# Patient Record
Sex: MALE | Race: OTHER | HISPANIC OR LATINO
[De-identification: names, ages, dates, MRNs, and addresses within clinical notes are randomized per-mention and may not be internally consistent; named-entity substitution may affect disease eponyms.]

---

## 2017-03-06 ENCOUNTER — MEDICATION RENEWAL (OUTPATIENT)
Age: 62
End: 2017-03-06

## 2017-04-11 ENCOUNTER — APPOINTMENT (OUTPATIENT)
Dept: INTERNAL MEDICINE | Facility: CLINIC | Age: 62
End: 2017-04-11

## 2017-04-11 ENCOUNTER — LABORATORY RESULT (OUTPATIENT)
Age: 62
End: 2017-04-11

## 2017-04-11 ENCOUNTER — MEDICATION RENEWAL (OUTPATIENT)
Age: 62
End: 2017-04-11

## 2017-04-11 VITALS — HEIGHT: 65 IN | WEIGHT: 207 LBS | BODY MASS INDEX: 34.49 KG/M2

## 2017-04-11 VITALS — HEART RATE: 72 BPM | SYSTOLIC BLOOD PRESSURE: 118 MMHG | RESPIRATION RATE: 12 BRPM | DIASTOLIC BLOOD PRESSURE: 76 MMHG

## 2017-04-12 LAB
25(OH)D3 SERPL-MCNC: 23.3 NG/ML
ALBUMIN SERPL ELPH-MCNC: 4.4 G/DL
ALP BLD-CCNC: 73 U/L
ALT SERPL-CCNC: 41 U/L
ANION GAP SERPL CALC-SCNC: 17 MMOL/L
APPEARANCE: CLEAR
AST SERPL-CCNC: 33 U/L
BASOPHILS # BLD AUTO: 0.08 K/UL
BASOPHILS NFR BLD AUTO: 1.2 %
BILIRUB SERPL-MCNC: 0.4 MG/DL
BILIRUBIN URINE: NEGATIVE
BLOOD URINE: NEGATIVE
BUN SERPL-MCNC: 12 MG/DL
CALCIUM SERPL-MCNC: 9.4 MG/DL
CHLORIDE SERPL-SCNC: 102 MMOL/L
CHOLEST SERPL-MCNC: 140 MG/DL
CHOLEST/HDLC SERPL: 4.4 RATIO
CO2 SERPL-SCNC: 21 MMOL/L
COLOR: YELLOW
CREAT SERPL-MCNC: 0.73 MG/DL
CREAT SPEC-SCNC: 113 MG/DL
EOSINOPHIL # BLD AUTO: 1.16 K/UL
EOSINOPHIL NFR BLD AUTO: 17.7 %
GLUCOSE QUALITATIVE U: NORMAL MG/DL
GLUCOSE SERPL-MCNC: 159 MG/DL
HBA1C MFR BLD HPLC: 8.9 %
HCT VFR BLD CALC: 41.6 %
HDLC SERPL-MCNC: 32 MG/DL
HGB BLD-MCNC: 12.9 G/DL
IMM GRANULOCYTES NFR BLD AUTO: 0.2 %
KETONES URINE: NEGATIVE
LDLC SERPL CALC-MCNC: 80 MG/DL
LEUKOCYTE ESTERASE URINE: NEGATIVE
LYMPHOCYTES # BLD AUTO: 1.62 K/UL
LYMPHOCYTES NFR BLD AUTO: 24.7 %
MAN DIFF?: NORMAL
MCHC RBC-ENTMCNC: 23.2 PG
MCHC RBC-ENTMCNC: 31 GM/DL
MCV RBC AUTO: 74.7 FL
MICROALBUMIN 24H UR DL<=1MG/L-MCNC: 2.6 MG/DL
MICROALBUMIN/CREAT 24H UR-RTO: 23 UG/MG
MONOCYTES # BLD AUTO: 0.33 K/UL
MONOCYTES NFR BLD AUTO: 5 %
NEUTROPHILS # BLD AUTO: 3.35 K/UL
NEUTROPHILS NFR BLD AUTO: 51.2 %
NITRITE URINE: NEGATIVE
PH URINE: 6
PLATELET # BLD AUTO: 314 K/UL
POTASSIUM SERPL-SCNC: 4.3 MMOL/L
PROT SERPL-MCNC: 7.1 G/DL
PROTEIN URINE: NEGATIVE MG/DL
PSA SERPL-MCNC: 0.2 NG/ML
RBC # BLD: 5.57 M/UL
RBC # FLD: 17.8 %
SODIUM SERPL-SCNC: 140 MMOL/L
SPECIFIC GRAVITY URINE: 1.02
T4 FREE SERPL-MCNC: 1.3 NG/DL
TRIGL SERPL-MCNC: 141 MG/DL
TSH SERPL-ACNC: 0.96 UIU/ML
UROBILINOGEN URINE: 1 MG/DL
WBC # FLD AUTO: 6.55 K/UL

## 2017-05-30 ENCOUNTER — MEDICATION RENEWAL (OUTPATIENT)
Age: 62
End: 2017-05-30

## 2017-06-15 ENCOUNTER — APPOINTMENT (OUTPATIENT)
Dept: INTERNAL MEDICINE | Facility: CLINIC | Age: 62
End: 2017-06-15

## 2017-06-15 VITALS — WEIGHT: 205 LBS | BODY MASS INDEX: 34.16 KG/M2 | HEIGHT: 65 IN

## 2017-06-15 VITALS — DIASTOLIC BLOOD PRESSURE: 68 MMHG | SYSTOLIC BLOOD PRESSURE: 124 MMHG | HEART RATE: 68 BPM | RESPIRATION RATE: 14 BRPM

## 2017-06-15 LAB
GLUCOSE BLDC GLUCOMTR-MCNC: 102
HBA1C MFR BLD HPLC: 8

## 2017-08-24 ENCOUNTER — MEDICATION RENEWAL (OUTPATIENT)
Age: 62
End: 2017-08-24

## 2017-09-21 ENCOUNTER — MEDICATION RENEWAL (OUTPATIENT)
Age: 62
End: 2017-09-21

## 2017-10-16 ENCOUNTER — APPOINTMENT (OUTPATIENT)
Dept: INTERNAL MEDICINE | Facility: CLINIC | Age: 62
End: 2017-10-16
Payer: MEDICARE

## 2017-10-16 VITALS
RESPIRATION RATE: 12 BRPM | HEART RATE: 72 BPM | DIASTOLIC BLOOD PRESSURE: 70 MMHG | BODY MASS INDEX: 35.82 KG/M2 | WEIGHT: 215 LBS | HEIGHT: 65 IN | SYSTOLIC BLOOD PRESSURE: 130 MMHG

## 2017-10-16 PROCEDURE — G0008: CPT

## 2017-10-16 PROCEDURE — 99214 OFFICE O/P EST MOD 30 MIN: CPT | Mod: 25

## 2017-10-16 PROCEDURE — 36415 COLL VENOUS BLD VENIPUNCTURE: CPT

## 2017-10-16 PROCEDURE — 90688 IIV4 VACCINE SPLT 0.5 ML IM: CPT

## 2017-10-17 LAB
ALBUMIN SERPL ELPH-MCNC: 4.4 G/DL
ALP BLD-CCNC: 71 U/L
ALT SERPL-CCNC: 35 U/L
ANION GAP SERPL CALC-SCNC: 12 MMOL/L
AST SERPL-CCNC: 31 U/L
BILIRUB SERPL-MCNC: 0.3 MG/DL
BUN SERPL-MCNC: 13 MG/DL
CALCIUM SERPL-MCNC: 9.4 MG/DL
CHLORIDE SERPL-SCNC: 104 MMOL/L
CHOLEST SERPL-MCNC: 129 MG/DL
CHOLEST/HDLC SERPL: 4.3 RATIO
CO2 SERPL-SCNC: 27 MMOL/L
CREAT SERPL-MCNC: 0.71 MG/DL
GLUCOSE SERPL-MCNC: 142 MG/DL
HBA1C MFR BLD HPLC: 7.6 %
HDLC SERPL-MCNC: 30 MG/DL
LDLC SERPL CALC-MCNC: 72 MG/DL
POTASSIUM SERPL-SCNC: 4.8 MMOL/L
PROT SERPL-MCNC: 7.3 G/DL
SODIUM SERPL-SCNC: 143 MMOL/L
TRIGL SERPL-MCNC: 134 MG/DL

## 2018-02-12 ENCOUNTER — APPOINTMENT (OUTPATIENT)
Dept: INTERNAL MEDICINE | Facility: CLINIC | Age: 63
End: 2018-02-12
Payer: MEDICARE

## 2018-02-12 ENCOUNTER — NON-APPOINTMENT (OUTPATIENT)
Age: 63
End: 2018-02-12

## 2018-02-12 VITALS
RESPIRATION RATE: 14 BRPM | BODY MASS INDEX: 34.32 KG/M2 | DIASTOLIC BLOOD PRESSURE: 90 MMHG | WEIGHT: 206 LBS | SYSTOLIC BLOOD PRESSURE: 160 MMHG | HEIGHT: 65 IN | HEART RATE: 65 BPM

## 2018-02-12 PROCEDURE — 36415 COLL VENOUS BLD VENIPUNCTURE: CPT

## 2018-02-12 PROCEDURE — 82270 OCCULT BLOOD FECES: CPT

## 2018-02-12 PROCEDURE — G0439: CPT

## 2018-02-12 PROCEDURE — 93000 ELECTROCARDIOGRAM COMPLETE: CPT

## 2018-02-13 LAB
25(OH)D3 SERPL-MCNC: 24.6 NG/ML
ALBUMIN SERPL ELPH-MCNC: 4.2 G/DL
ALP BLD-CCNC: 67 U/L
ALT SERPL-CCNC: 35 U/L
ANION GAP SERPL CALC-SCNC: 17 MMOL/L
APPEARANCE: CLEAR
AST SERPL-CCNC: 31 U/L
BASOPHILS # BLD AUTO: 0.07 K/UL
BASOPHILS NFR BLD AUTO: 1 %
BILIRUB SERPL-MCNC: 0.3 MG/DL
BILIRUBIN URINE: NEGATIVE
BLOOD URINE: NEGATIVE
BUN SERPL-MCNC: 9 MG/DL
CALCIUM SERPL-MCNC: 9.8 MG/DL
CHLORIDE SERPL-SCNC: 102 MMOL/L
CHOLEST SERPL-MCNC: 135 MG/DL
CHOLEST/HDLC SERPL: 4.8 RATIO
CO2 SERPL-SCNC: 24 MMOL/L
COLOR: YELLOW
CREAT SERPL-MCNC: 0.74 MG/DL
CREAT SPEC-SCNC: 69 MG/DL
EOSINOPHIL # BLD AUTO: 1.18 K/UL
EOSINOPHIL NFR BLD AUTO: 17.3 %
GLUCOSE QUALITATIVE U: NEGATIVE MG/DL
GLUCOSE SERPL-MCNC: 125 MG/DL
HBA1C MFR BLD HPLC: 7.4 %
HCT VFR BLD CALC: 44.1 %
HDLC SERPL-MCNC: 28 MG/DL
HGB BLD-MCNC: 13.5 G/DL
IMM GRANULOCYTES NFR BLD AUTO: 0.1 %
KETONES URINE: NEGATIVE
LDLC SERPL CALC-MCNC: 79 MG/DL
LEUKOCYTE ESTERASE URINE: NEGATIVE
LYMPHOCYTES # BLD AUTO: 1.56 K/UL
LYMPHOCYTES NFR BLD AUTO: 22.8 %
MAN DIFF?: NORMAL
MCHC RBC-ENTMCNC: 24.5 PG
MCHC RBC-ENTMCNC: 30.6 GM/DL
MCV RBC AUTO: 80 FL
MICROALBUMIN 24H UR DL<=1MG/L-MCNC: 1.2 MG/DL
MICROALBUMIN/CREAT 24H UR-RTO: 17 MG/G
MONOCYTES # BLD AUTO: 0.46 K/UL
MONOCYTES NFR BLD AUTO: 6.7 %
NEUTROPHILS # BLD AUTO: 3.55 K/UL
NEUTROPHILS NFR BLD AUTO: 52.1 %
NITRITE URINE: NEGATIVE
PH URINE: 7.5
PLATELET # BLD AUTO: 291 K/UL
POTASSIUM SERPL-SCNC: 4.8 MMOL/L
PROT SERPL-MCNC: 7.2 G/DL
PROTEIN URINE: NEGATIVE MG/DL
PSA SERPL-MCNC: 0.22 NG/ML
RBC # BLD: 5.51 M/UL
RBC # FLD: 16.5 %
SODIUM SERPL-SCNC: 143 MMOL/L
SPECIFIC GRAVITY URINE: 1.01
T4 FREE SERPL-MCNC: 1.2 NG/DL
TRIGL SERPL-MCNC: 142 MG/DL
TSH SERPL-ACNC: 1.48 UIU/ML
UROBILINOGEN URINE: 1 MG/DL
WBC # FLD AUTO: 6.83 K/UL

## 2018-03-20 ENCOUNTER — APPOINTMENT (OUTPATIENT)
Dept: INTERNAL MEDICINE | Facility: CLINIC | Age: 63
End: 2018-03-20
Payer: MEDICARE

## 2018-03-20 VITALS
DIASTOLIC BLOOD PRESSURE: 68 MMHG | WEIGHT: 207 LBS | RESPIRATION RATE: 12 BRPM | BODY MASS INDEX: 34.49 KG/M2 | HEIGHT: 65 IN | HEART RATE: 70 BPM | SYSTOLIC BLOOD PRESSURE: 132 MMHG

## 2018-03-20 DIAGNOSIS — M25.562 PAIN IN LEFT KNEE: ICD-10-CM

## 2018-03-20 PROCEDURE — 99214 OFFICE O/P EST MOD 30 MIN: CPT

## 2018-04-09 ENCOUNTER — MEDICATION RENEWAL (OUTPATIENT)
Age: 63
End: 2018-04-09

## 2018-07-23 ENCOUNTER — APPOINTMENT (OUTPATIENT)
Dept: INTERNAL MEDICINE | Facility: CLINIC | Age: 63
End: 2018-07-23
Payer: MEDICARE

## 2018-07-23 ENCOUNTER — RESULT CHARGE (OUTPATIENT)
Age: 63
End: 2018-07-23

## 2018-07-23 VITALS
BODY MASS INDEX: 34.82 KG/M2 | DIASTOLIC BLOOD PRESSURE: 68 MMHG | WEIGHT: 209 LBS | RESPIRATION RATE: 14 BRPM | HEIGHT: 65 IN | HEART RATE: 80 BPM | SYSTOLIC BLOOD PRESSURE: 116 MMHG

## 2018-07-23 PROCEDURE — 36415 COLL VENOUS BLD VENIPUNCTURE: CPT

## 2018-07-23 PROCEDURE — 99214 OFFICE O/P EST MOD 30 MIN: CPT | Mod: 25

## 2018-07-23 NOTE — ASSESSMENT
[FreeTextEntry1] : medically stable\par needs optho exam\par check labs\par no need to adjust meds\par

## 2018-07-23 NOTE — HISTORY OF PRESENT ILLNESS
[FreeTextEntry1] : for follow up\par dm htn hyperliidemiia, prior cva [de-identified] : patient has been well\par he has no chest pain sob nvd \par he has been compliant with meds\par has no new issues to report

## 2018-07-23 NOTE — PHYSICAL EXAM

## 2018-07-24 LAB
ALBUMIN SERPL ELPH-MCNC: 4.5 G/DL
ALP BLD-CCNC: 76 U/L
ALT SERPL-CCNC: 44 U/L
ANION GAP SERPL CALC-SCNC: 15 MMOL/L
AST SERPL-CCNC: 33 U/L
BILIRUB SERPL-MCNC: 0.2 MG/DL
BUN SERPL-MCNC: 11 MG/DL
CALCIUM SERPL-MCNC: 9.2 MG/DL
CHLORIDE SERPL-SCNC: 101 MMOL/L
CHOLEST SERPL-MCNC: 147 MG/DL
CHOLEST/HDLC SERPL: 4.9 RATIO
CO2 SERPL-SCNC: 25 MMOL/L
CREAT SERPL-MCNC: 0.64 MG/DL
GLUCOSE SERPL-MCNC: 120 MG/DL
HBA1C MFR BLD HPLC: 8.5 %
HDLC SERPL-MCNC: 30 MG/DL
LDLC SERPL CALC-MCNC: 84 MG/DL
POTASSIUM SERPL-SCNC: 5.1 MMOL/L
PROT SERPL-MCNC: 6.8 G/DL
SODIUM SERPL-SCNC: 141 MMOL/L
TRIGL SERPL-MCNC: 163 MG/DL

## 2018-11-26 ENCOUNTER — APPOINTMENT (OUTPATIENT)
Dept: INTERNAL MEDICINE | Facility: CLINIC | Age: 63
End: 2018-11-26
Payer: MEDICARE

## 2018-11-26 VITALS
HEIGHT: 65 IN | SYSTOLIC BLOOD PRESSURE: 138 MMHG | BODY MASS INDEX: 35.16 KG/M2 | DIASTOLIC BLOOD PRESSURE: 78 MMHG | RESPIRATION RATE: 12 BRPM | WEIGHT: 211 LBS | HEART RATE: 67 BPM

## 2018-11-26 PROCEDURE — 99214 OFFICE O/P EST MOD 30 MIN: CPT

## 2018-11-26 NOTE — ASSESSMENT
[FreeTextEntry1] : medically stable\par check labs\par due for flu vaccine\par not availabe in the office today\par

## 2018-11-26 NOTE — HISTORY OF PRESENT ILLNESS
[FreeTextEntry1] : follow up dm, htn, hyperllipidemia [de-identified] : follow up dm, htn, hld\par has been well\par recovered completely from prior stroke\par he is due for flu vaccine but do not have available\par patient has no chest pain sob nvd or palpitaitons.

## 2018-11-27 LAB
ALBUMIN SERPL ELPH-MCNC: 4.6 G/DL
ALP BLD-CCNC: 66 U/L
ALT SERPL-CCNC: 70 U/L
ANION GAP SERPL CALC-SCNC: 10 MMOL/L
AST SERPL-CCNC: 49 U/L
BILIRUB SERPL-MCNC: 0.3 MG/DL
BUN SERPL-MCNC: 10 MG/DL
CALCIUM SERPL-MCNC: 9.9 MG/DL
CHLORIDE SERPL-SCNC: 103 MMOL/L
CHOLEST SERPL-MCNC: 135 MG/DL
CHOLEST/HDLC SERPL: 4.2 RATIO
CO2 SERPL-SCNC: 29 MMOL/L
CREAT SERPL-MCNC: 0.71 MG/DL
GLUCOSE SERPL-MCNC: 88 MG/DL
HBA1C MFR BLD HPLC: 8.2 %
HDLC SERPL-MCNC: 32 MG/DL
LDLC SERPL CALC-MCNC: 78 MG/DL
POTASSIUM SERPL-SCNC: 4.8 MMOL/L
PROT SERPL-MCNC: 7.4 G/DL
SODIUM SERPL-SCNC: 142 MMOL/L
TRIGL SERPL-MCNC: 125 MG/DL

## 2019-01-08 ENCOUNTER — MEDICATION RENEWAL (OUTPATIENT)
Age: 64
End: 2019-01-08

## 2019-02-11 ENCOUNTER — RX RENEWAL (OUTPATIENT)
Age: 64
End: 2019-02-11

## 2019-02-25 ENCOUNTER — NON-APPOINTMENT (OUTPATIENT)
Age: 64
End: 2019-02-25

## 2019-02-25 ENCOUNTER — APPOINTMENT (OUTPATIENT)
Dept: INTERNAL MEDICINE | Facility: CLINIC | Age: 64
End: 2019-02-25
Payer: MEDICARE

## 2019-02-25 VITALS — HEIGHT: 65 IN | BODY MASS INDEX: 34.82 KG/M2 | WEIGHT: 209 LBS

## 2019-02-25 VITALS — DIASTOLIC BLOOD PRESSURE: 75 MMHG | RESPIRATION RATE: 16 BRPM | SYSTOLIC BLOOD PRESSURE: 128 MMHG | HEART RATE: 75 BPM

## 2019-02-25 PROCEDURE — G0439: CPT

## 2019-02-25 PROCEDURE — 36415 COLL VENOUS BLD VENIPUNCTURE: CPT

## 2019-02-25 PROCEDURE — 93000 ELECTROCARDIOGRAM COMPLETE: CPT

## 2019-02-25 RX ORDER — NIFEDIPINE 60 MG/1
60 TABLET, EXTENDED RELEASE ORAL DAILY
Qty: 90 | Refills: 1 | Status: DISCONTINUED | COMMUNITY
Start: 2018-02-12 | End: 2019-02-25

## 2019-02-25 NOTE — ASSESSMENT
[FreeTextEntry1] : medically stable\par see ophto\par see gi for colon cancer screening\par up to date with vaccines\par

## 2019-02-25 NOTE — HEALTH RISK ASSESSMENT
[Excellent] : ~his/her~  mood as  excellent [] : No [No falls in past year] : Patient reported no falls in the past year [0] : 2) Feeling down, depressed, or hopeless: Not at all (0) [HIV test declined] : HIV test declined [Change in mental status noted] : No change in mental status noted [Language] : denies difficulty with language [Behavior] : denies difficulty with behavior [Learning/Retaining New Information] : denies difficulty learning/retaining new information [Handling Complex Tasks] : denies difficulty handling complex tasks [Reasoning] : denies difficulty with reasoning [Spatial Ability and Orientation] : denies difficulty with spatial ability and orientation [None] : None [With Family] : lives with family [Retired] : retired [High School] : high school [Sexually Active] : not sexually active [High Risk Behavior] : no high risk behavior [Feels Safe at Home] : Feels safe at home [Fully functional (bathing, dressing, toileting, transferring, walking, feeding)] : Fully functional (bathing, dressing, toileting, transferring, walking, feeding) [Fully functional (using the telephone, shopping, preparing meals, housekeeping, doing laundry, using] : Fully functional and needs no help or supervision to perform IADLs (using the telephone, shopping, preparing meals, housekeeping, doing laundry, using transportation, managing medications and managing finances) [Reports changes in hearing] : Reports no changes in hearing [Reports changes in vision] : Reports no changes in vision [Reports normal functional visual acuity (ie: able to read med bottle)] : Reports poor functional visual acuity.  [Reports changes in dental health] : Reports changes in dental health [Smoke Detector] : smoke detector [Guns at Home] : no guns at home [Safety elements used in home] : safety elements used in home [ColonoscopyDate] : due [ColonoscopyComments] : refused in the past will try to go this year [Patient/Caregiver unclear of wishes] : Patient/Caregiver unclear of wishes

## 2019-02-25 NOTE — PHYSICAL EXAM
[No Acute Distress] : no acute distress [Well Nourished] : well nourished [Well Developed] : well developed [Well-Appearing] : well-appearing [Normal Sclera/Conjunctiva] : normal sclera/conjunctiva [PERRL] : pupils equal round and reactive to light [EOMI] : extraocular movements intact [Normal Outer Ear/Nose] : the outer ears and nose were normal in appearance [Normal Oropharynx] : the oropharynx was normal [No JVD] : no jugular venous distention [Supple] : supple [No Lymphadenopathy] : no lymphadenopathy [Thyroid Normal, No Nodules] : the thyroid was normal and there were no nodules present [No Respiratory Distress] : no respiratory distress  [Clear to Auscultation] : lungs were clear to auscultation bilaterally [No Accessory Muscle Use] : no accessory muscle use [Normal Rate] : normal rate  [Regular Rhythm] : with a regular rhythm [Normal S1, S2] : normal S1 and S2 [No Murmur] : no murmur heard [No Carotid Bruits] : no carotid bruits [No Abdominal Bruit] : a ~M bruit was not heard ~T in the abdomen [No Varicosities] : no varicosities [Pedal Pulses Present] : the pedal pulses are present [No Edema] : there was no peripheral edema [No Extremity Clubbing/Cyanosis] : no extremity clubbing/cyanosis [No Palpable Aorta] : no palpable aorta [Soft] : abdomen soft [Non Tender] : non-tender [Non-distended] : non-distended [No Masses] : no abdominal mass palpated [No HSM] : no HSM [Normal Bowel Sounds] : normal bowel sounds [No Stool to Guaiac] : no stool to guaiac [Normal Sphincter Tone] : normal sphincter tone [No Mass] : no mass [Stool Occult Blood] : stool negative for occult blood [Normal Posterior Cervical Nodes] : no posterior cervical lymphadenopathy [Normal Anterior Cervical Nodes] : no anterior cervical lymphadenopathy [No CVA Tenderness] : no CVA  tenderness [No Spinal Tenderness] : no spinal tenderness [No Joint Swelling] : no joint swelling [Grossly Normal Strength/Tone] : grossly normal strength/tone [No Rash] : no rash [Normal Gait] : normal gait [Coordination Grossly Intact] : coordination grossly intact [No Focal Deficits] : no focal deficits [Deep Tendon Reflexes (DTR)] : deep tendon reflexes were 2+ and symmetric [Normal Affect] : the affect was normal [Normal Insight/Judgement] : insight and judgment were intact [Comprehensive Foot Exam Normal] : Right and left foot were examined and both feet are normal. No ulcers in either foot. Toes are normal and with full ROM.  Normal tactile sensation with monofilament testing throughout both feet

## 2019-02-25 NOTE — HISTORY OF PRESENT ILLNESS
[FreeTextEntry1] : For CPE [de-identified] : FOr Cpe \par has history of cva, niddm, htn has been well\par he has no new health issues to report\par has been compliant with meds

## 2019-02-26 ENCOUNTER — RESULT CHARGE (OUTPATIENT)
Age: 64
End: 2019-02-26

## 2019-02-26 ENCOUNTER — CHART COPY (OUTPATIENT)
Age: 64
End: 2019-02-26

## 2019-02-26 LAB
25(OH)D3 SERPL-MCNC: 24.5 NG/ML
ALBUMIN SERPL ELPH-MCNC: 4.6 G/DL
ALP BLD-CCNC: 73 U/L
ALT SERPL-CCNC: 35 U/L
ANION GAP SERPL CALC-SCNC: 11 MMOL/L
APPEARANCE: CLEAR
AST SERPL-CCNC: 27 U/L
BASOPHILS # BLD AUTO: 0.11 K/UL
BASOPHILS NFR BLD AUTO: 1.6 %
BILIRUB SERPL-MCNC: 0.3 MG/DL
BILIRUBIN URINE: NEGATIVE
BLOOD URINE: NEGATIVE
BUN SERPL-MCNC: 11 MG/DL
CALCIUM SERPL-MCNC: 9.6 MG/DL
CHLORIDE SERPL-SCNC: 103 MMOL/L
CHOLEST SERPL-MCNC: 128 MG/DL
CHOLEST/HDLC SERPL: 4.3 RATIO
CO2 SERPL-SCNC: 26 MMOL/L
COLOR: YELLOW
CREAT SERPL-MCNC: 0.59 MG/DL
CREAT SPEC-SCNC: 116 MG/DL
EOSINOPHIL # BLD AUTO: 1.16 K/UL
EOSINOPHIL NFR BLD AUTO: 16.6 %
GLUCOSE QUALITATIVE U: NEGATIVE
GLUCOSE SERPL-MCNC: 138 MG/DL
HBA1C MFR BLD HPLC: 7.9 %
HCT VFR BLD CALC: 44.4 %
HDLC SERPL-MCNC: 30 MG/DL
HGB BLD-MCNC: 13.4 G/DL
IMM GRANULOCYTES NFR BLD AUTO: 0.1 %
KETONES URINE: NEGATIVE
LDLC SERPL CALC-MCNC: 74 MG/DL
LEUKOCYTE ESTERASE URINE: NEGATIVE
LYMPHOCYTES # BLD AUTO: 1.61 K/UL
LYMPHOCYTES NFR BLD AUTO: 23.1 %
MAN DIFF?: NORMAL
MCHC RBC-ENTMCNC: 24.9 PG
MCHC RBC-ENTMCNC: 30.2 GM/DL
MCV RBC AUTO: 82.4 FL
MICROALBUMIN 24H UR DL<=1MG/L-MCNC: <1.2 MG/DL
MICROALBUMIN/CREAT 24H UR-RTO: NORMAL MG/G
MONOCYTES # BLD AUTO: 0.43 K/UL
MONOCYTES NFR BLD AUTO: 6.2 %
NEUTROPHILS # BLD AUTO: 3.65 K/UL
NEUTROPHILS NFR BLD AUTO: 52.4 %
NITRITE URINE: NEGATIVE
PH URINE: 7.5
PLATELET # BLD AUTO: 287 K/UL
POTASSIUM SERPL-SCNC: 4.5 MMOL/L
PROT SERPL-MCNC: 7.1 G/DL
PROTEIN URINE: NORMAL
PSA SERPL-MCNC: 0.22 NG/ML
RBC # BLD: 5.39 M/UL
RBC # FLD: 16.8 %
SODIUM SERPL-SCNC: 140 MMOL/L
SPECIFIC GRAVITY URINE: 1.02
T4 FREE SERPL-MCNC: 1.4 NG/DL
TRIGL SERPL-MCNC: 118 MG/DL
TSH SERPL-ACNC: 1.4 UIU/ML
UROBILINOGEN URINE: NORMAL
WBC # FLD AUTO: 6.97 K/UL

## 2019-06-17 ENCOUNTER — RX RENEWAL (OUTPATIENT)
Age: 64
End: 2019-06-17

## 2019-07-18 ENCOUNTER — MEDICATION RENEWAL (OUTPATIENT)
Age: 64
End: 2019-07-18

## 2019-08-15 ENCOUNTER — MEDICATION RENEWAL (OUTPATIENT)
Age: 64
End: 2019-08-15

## 2019-08-19 ENCOUNTER — RX RENEWAL (OUTPATIENT)
Age: 64
End: 2019-08-19

## 2019-08-28 ENCOUNTER — CHART COPY (OUTPATIENT)
Age: 64
End: 2019-08-28

## 2019-08-29 ENCOUNTER — APPOINTMENT (OUTPATIENT)
Dept: INTERNAL MEDICINE | Facility: CLINIC | Age: 64
End: 2019-08-29
Payer: MEDICARE

## 2019-08-29 VITALS — DIASTOLIC BLOOD PRESSURE: 78 MMHG | HEART RATE: 68 BPM | SYSTOLIC BLOOD PRESSURE: 132 MMHG | RESPIRATION RATE: 12 BRPM

## 2019-08-29 VITALS — BODY MASS INDEX: 34.61 KG/M2 | WEIGHT: 208 LBS

## 2019-08-29 PROCEDURE — 99214 OFFICE O/P EST MOD 30 MIN: CPT | Mod: 25

## 2019-08-29 PROCEDURE — 36415 COLL VENOUS BLD VENIPUNCTURE: CPT

## 2019-08-29 NOTE — ASSESSMENT
[FreeTextEntry1] : check labs\par continue meds\par meds renewed\par follow up December\par fall vaccine for flu

## 2019-08-30 ENCOUNTER — CHART COPY (OUTPATIENT)
Age: 64
End: 2019-08-30

## 2019-08-30 LAB
ALBUMIN SERPL ELPH-MCNC: 4.7 G/DL
ALP BLD-CCNC: 76 U/L
ALT SERPL-CCNC: 49 U/L
ANION GAP SERPL CALC-SCNC: 11 MMOL/L
AST SERPL-CCNC: 38 U/L
BILIRUB SERPL-MCNC: 0.3 MG/DL
BUN SERPL-MCNC: 12 MG/DL
CALCIUM SERPL-MCNC: 9.3 MG/DL
CHLORIDE SERPL-SCNC: 104 MMOL/L
CHOLEST SERPL-MCNC: 143 MG/DL
CHOLEST/HDLC SERPL: 4.5 RATIO
CO2 SERPL-SCNC: 25 MMOL/L
CREAT SERPL-MCNC: 0.6 MG/DL
ESTIMATED AVERAGE GLUCOSE: 177 MG/DL
GLUCOSE SERPL-MCNC: 116 MG/DL
HBA1C MFR BLD HPLC: 7.8 %
HDLC SERPL-MCNC: 32 MG/DL
LDLC SERPL CALC-MCNC: 88 MG/DL
POTASSIUM SERPL-SCNC: 4.7 MMOL/L
PROT SERPL-MCNC: 7.3 G/DL
SODIUM SERPL-SCNC: 140 MMOL/L
TRIGL SERPL-MCNC: 117 MG/DL

## 2019-12-11 ENCOUNTER — RX RENEWAL (OUTPATIENT)
Age: 64
End: 2019-12-11

## 2019-12-30 ENCOUNTER — APPOINTMENT (OUTPATIENT)
Dept: INTERNAL MEDICINE | Facility: CLINIC | Age: 64
End: 2019-12-30
Payer: MEDICARE

## 2019-12-30 VITALS — BODY MASS INDEX: 34.82 KG/M2 | WEIGHT: 209 LBS | HEIGHT: 65 IN

## 2019-12-30 VITALS — RESPIRATION RATE: 14 BRPM | HEART RATE: 75 BPM | SYSTOLIC BLOOD PRESSURE: 138 MMHG | DIASTOLIC BLOOD PRESSURE: 78 MMHG

## 2019-12-30 PROCEDURE — 99214 OFFICE O/P EST MOD 30 MIN: CPT | Mod: 25

## 2019-12-30 PROCEDURE — G0008: CPT

## 2019-12-30 PROCEDURE — 36415 COLL VENOUS BLD VENIPUNCTURE: CPT

## 2019-12-30 PROCEDURE — 90682 RIV4 VACC RECOMBINANT DNA IM: CPT

## 2019-12-30 NOTE — ASSESSMENT
[FreeTextEntry1] : cva stable\par bp stable\par dm stable, check labs\par lipids labs due for check\par flu vaccine given

## 2019-12-30 NOTE — HISTORY OF PRESENT ILLNESS
[FreeTextEntry1] : follow up dm, htn, hld\par s/p cva  years ago [de-identified] : patient here for follow up\par has been well \par needs med renewals\par he has no chest pain sob nvd or palpitaitons\par needs lfu vaccine

## 2019-12-31 ENCOUNTER — CHART COPY (OUTPATIENT)
Age: 64
End: 2019-12-31

## 2019-12-31 LAB
ALBUMIN SERPL ELPH-MCNC: 4.2 G/DL
ALP BLD-CCNC: 78 U/L
ALT SERPL-CCNC: 43 U/L
ANION GAP SERPL CALC-SCNC: 12 MMOL/L
AST SERPL-CCNC: 31 U/L
BILIRUB SERPL-MCNC: 0.2 MG/DL
BUN SERPL-MCNC: 13 MG/DL
CALCIUM SERPL-MCNC: 9.3 MG/DL
CHLORIDE SERPL-SCNC: 102 MMOL/L
CHOLEST SERPL-MCNC: 144 MG/DL
CHOLEST/HDLC SERPL: 4.5 RATIO
CO2 SERPL-SCNC: 27 MMOL/L
CREAT SERPL-MCNC: 0.7 MG/DL
ESTIMATED AVERAGE GLUCOSE: 194 MG/DL
GLUCOSE SERPL-MCNC: 170 MG/DL
HBA1C MFR BLD HPLC: 8.4 %
HDLC SERPL-MCNC: 32 MG/DL
LDLC SERPL CALC-MCNC: 87 MG/DL
POTASSIUM SERPL-SCNC: 5 MMOL/L
PROT SERPL-MCNC: 6.6 G/DL
SODIUM SERPL-SCNC: 141 MMOL/L
TRIGL SERPL-MCNC: 123 MG/DL

## 2020-01-07 ENCOUNTER — CHART COPY (OUTPATIENT)
Age: 65
End: 2020-01-07

## 2020-04-01 ENCOUNTER — RX RENEWAL (OUTPATIENT)
Age: 65
End: 2020-04-01

## 2020-04-09 ENCOUNTER — RX RENEWAL (OUTPATIENT)
Age: 65
End: 2020-04-09

## 2020-07-07 ENCOUNTER — APPOINTMENT (OUTPATIENT)
Dept: INTERNAL MEDICINE | Facility: CLINIC | Age: 65
End: 2020-07-07
Payer: MEDICARE

## 2020-07-07 ENCOUNTER — NON-APPOINTMENT (OUTPATIENT)
Age: 65
End: 2020-07-07

## 2020-07-07 VITALS
RESPIRATION RATE: 13 BRPM | SYSTOLIC BLOOD PRESSURE: 130 MMHG | BODY MASS INDEX: 34.32 KG/M2 | OXYGEN SATURATION: 97 % | WEIGHT: 206 LBS | HEIGHT: 65 IN | DIASTOLIC BLOOD PRESSURE: 84 MMHG | HEART RATE: 78 BPM

## 2020-07-07 DIAGNOSIS — Z11.59 ENCOUNTER FOR SCREENING FOR OTHER VIRAL DISEASES: ICD-10-CM

## 2020-07-07 LAB
CARD LOT #: 691
CARD LOT EXP DATE: NORMAL
DATE COLLECTED: NORMAL
DEVELOPER LOT #: NORMAL
DEVELOPER LOT EXP DATE: NORMAL
HEMOCCULT 2: NEGATIVE
HEMOCCULT 3: NEGATIVE
HEMOCCULT SP1 STL QL: NEGATIVE
QUALITY CONTROL: YES

## 2020-07-07 PROCEDURE — G0439: CPT

## 2020-07-07 PROCEDURE — 82270 OCCULT BLOOD FECES: CPT

## 2020-07-07 PROCEDURE — 36415 COLL VENOUS BLD VENIPUNCTURE: CPT

## 2020-07-07 PROCEDURE — 93000 ELECTROCARDIOGRAM COMPLETE: CPT

## 2020-07-07 NOTE — HISTORY OF PRESENT ILLNESS
[FreeTextEntry1] : For cpe [de-identified] : For CPE\par history of cva, htn, hld, niddm\par has been well\par no new issues\par no chest pain sob nvd or palpitatons\par has been taking all meds\par

## 2020-07-07 NOTE — ASSESSMENT
[FreeTextEntry1] : medically stable\par check labs\par ekg normal\par get colonoscopy\par and optho eval\par continue meds\par dm stable\par cva stable bpstable

## 2020-07-07 NOTE — PHYSICAL EXAM
[No Acute Distress] : no acute distress [Well Nourished] : well nourished [Well Developed] : well developed [Well-Appearing] : well-appearing [Normal Sclera/Conjunctiva] : normal sclera/conjunctiva [PERRL] : pupils equal round and reactive to light [EOMI] : extraocular movements intact [Normal Outer Ear/Nose] : the outer ears and nose were normal in appearance [No JVD] : no jugular venous distention [Normal Oropharynx] : the oropharynx was normal [No Lymphadenopathy] : no lymphadenopathy [Thyroid Normal, No Nodules] : the thyroid was normal and there were no nodules present [Supple] : supple [No Accessory Muscle Use] : no accessory muscle use [No Respiratory Distress] : no respiratory distress  [Normal Rate] : normal rate  [Clear to Auscultation] : lungs were clear to auscultation bilaterally [Normal S1, S2] : normal S1 and S2 [Regular Rhythm] : with a regular rhythm [No Murmur] : no murmur heard [No Carotid Bruits] : no carotid bruits [No Abdominal Bruit] : a ~M bruit was not heard ~T in the abdomen [No Varicosities] : no varicosities [Pedal Pulses Present] : the pedal pulses are present [No Edema] : there was no peripheral edema [No Palpable Aorta] : no palpable aorta [No Extremity Clubbing/Cyanosis] : no extremity clubbing/cyanosis [Soft] : abdomen soft [Non Tender] : non-tender [Non-distended] : non-distended [No Masses] : no abdominal mass palpated [No HSM] : no HSM [Normal Bowel Sounds] : normal bowel sounds [Normal Posterior Cervical Nodes] : no posterior cervical lymphadenopathy [Normal Anterior Cervical Nodes] : no anterior cervical lymphadenopathy [No CVA Tenderness] : no CVA  tenderness [No Spinal Tenderness] : no spinal tenderness [No Joint Swelling] : no joint swelling [Grossly Normal Strength/Tone] : grossly normal strength/tone [No Rash] : no rash [No Focal Deficits] : no focal deficits [Coordination Grossly Intact] : coordination grossly intact [Deep Tendon Reflexes (DTR)] : deep tendon reflexes were 2+ and symmetric [Normal Gait] : normal gait [Normal Affect] : the affect was normal [Normal Insight/Judgement] : insight and judgment were intact

## 2020-07-07 NOTE — HEALTH RISK ASSESSMENT
[Excellent] : ~his/her~  mood as  excellent [Yes] : Yes [No falls in past year] : Patient reported no falls in the past year [0] : 2) Feeling down, depressed, or hopeless: Not at all (0) [HIV Test offered] : HIV Test offered [Hepatitis C test offered] : Hepatitis C test offered [None] : None [Employed] : employed [With Significant Other] : lives with significant other [High School] : high school [] :  [Sexually Active] : sexually active [Feels Safe at Home] : Feels safe at home [Fully functional (bathing, dressing, toileting, transferring, walking, feeding)] : Fully functional (bathing, dressing, toileting, transferring, walking, feeding) [Fully functional (using the telephone, shopping, preparing meals, housekeeping, doing laundry, using] : Fully functional and needs no help or supervision to perform IADLs (using the telephone, shopping, preparing meals, housekeeping, doing laundry, using transportation, managing medications and managing finances) [Smoke Detector] : smoke detector [Safety elements used in home] : safety elements used in home [Seat Belt] :  uses seat belt [Travel to Developing Areas] : travel to developing areas [] : No [Language] : denies difficulty with language [Change in mental status noted] : No change in mental status noted [Behavior] : denies difficulty with behavior [Learning/Retaining New Information] : denies difficulty learning/retaining new information [Handling Complex Tasks] : denies difficulty handling complex tasks [Reasoning] : denies difficulty with reasoning [Spatial Ability and Orientation] : denies difficulty with spatial ability and orientation [High Risk Behavior] : no high risk behavior [Reports changes in hearing] : Reports no changes in hearing [Reports changes in vision] : Reports no changes in vision [Reports changes in dental health] : Reports no changes in dental health [Reports normal functional visual acuity (ie: able to read med bottle)] : Reports poor functional visual acuity.  [Carbon Monoxide Detector] : no carbon monoxide detector [Guns at Home] : no guns at home [Caregiver Concerns] : does not have caregiver concerns [ColonoscopyDate] : due [Sunscreen] : does not use sunscreen [ColonoscopyComments] : refused in past will try to go this year [AdvancecareDate] : 7/7/2020

## 2020-07-08 LAB
25(OH)D3 SERPL-MCNC: 32.4 NG/ML
ALBUMIN SERPL ELPH-MCNC: 4.5 G/DL
ALP BLD-CCNC: 77 U/L
ALT SERPL-CCNC: 35 U/L
ANION GAP SERPL CALC-SCNC: 14 MMOL/L
APPEARANCE: CLEAR
AST SERPL-CCNC: 32 U/L
BASOPHILS # BLD AUTO: 0.09 K/UL
BASOPHILS NFR BLD AUTO: 1.1 %
BILIRUB SERPL-MCNC: 0.4 MG/DL
BILIRUBIN URINE: NEGATIVE
BLOOD URINE: NEGATIVE
BUN SERPL-MCNC: 10 MG/DL
CALCIUM SERPL-MCNC: 9.4 MG/DL
CHLORIDE SERPL-SCNC: 103 MMOL/L
CHOLEST SERPL-MCNC: 149 MG/DL
CHOLEST/HDLC SERPL: 4.6 RATIO
CO2 SERPL-SCNC: 25 MMOL/L
COLOR: YELLOW
CREAT SERPL-MCNC: 0.6 MG/DL
CREAT SPEC-SCNC: 291 MG/DL
EOSINOPHIL # BLD AUTO: 1.4 K/UL
EOSINOPHIL NFR BLD AUTO: 17.2 %
ESTIMATED AVERAGE GLUCOSE: 146 MG/DL
GLUCOSE QUALITATIVE U: NEGATIVE
GLUCOSE SERPL-MCNC: 77 MG/DL
HBA1C MFR BLD HPLC: 6.7 %
HCT VFR BLD CALC: 43.9 %
HCV AB SER QL: NONREACTIVE
HCV S/CO RATIO: 0.1 S/CO
HDLC SERPL-MCNC: 32 MG/DL
HGB BLD-MCNC: 13.6 G/DL
IMM GRANULOCYTES NFR BLD AUTO: 0.1 %
KETONES URINE: NEGATIVE
LDLC SERPL CALC-MCNC: 84 MG/DL
LEUKOCYTE ESTERASE URINE: NEGATIVE
LYMPHOCYTES # BLD AUTO: 1.97 K/UL
LYMPHOCYTES NFR BLD AUTO: 24.2 %
MAN DIFF?: NORMAL
MCHC RBC-ENTMCNC: 25 PG
MCHC RBC-ENTMCNC: 31 GM/DL
MCV RBC AUTO: 80.7 FL
MICROALBUMIN 24H UR DL<=1MG/L-MCNC: 1.9 MG/DL
MICROALBUMIN/CREAT 24H UR-RTO: 7 MG/G
MONOCYTES # BLD AUTO: 0.49 K/UL
MONOCYTES NFR BLD AUTO: 6 %
NEUTROPHILS # BLD AUTO: 4.17 K/UL
NEUTROPHILS NFR BLD AUTO: 51.4 %
NITRITE URINE: NEGATIVE
PH URINE: 6.5
PLATELET # BLD AUTO: 294 K/UL
POTASSIUM SERPL-SCNC: 4.4 MMOL/L
PROT SERPL-MCNC: 7 G/DL
PROTEIN URINE: NORMAL
PSA SERPL-MCNC: 0.19 NG/ML
RBC # BLD: 5.44 M/UL
RBC # FLD: 15.7 %
SARS-COV-2 IGG SERPL IA-ACNC: 0.09 INDEX
SARS-COV-2 IGG SERPL QL IA: NEGATIVE
SODIUM SERPL-SCNC: 142 MMOL/L
SPECIFIC GRAVITY URINE: 1.02
T4 FREE SERPL-MCNC: 1.3 NG/DL
TRIGL SERPL-MCNC: 161 MG/DL
TSH SERPL-ACNC: 1.09 UIU/ML
UROBILINOGEN URINE: NORMAL
WBC # FLD AUTO: 8.13 K/UL

## 2020-07-11 LAB — HUMAN IMMUNODEFICIENCY VIRUS 1 (HIV-1) QUALITATIVE, RNA: NEGATIVE

## 2020-10-19 NOTE — PHYSICAL EXAM
[No Acute Distress] : no acute distress [Well Nourished] : well nourished [Well Developed] : well developed [Well-Appearing] : well-appearing [Normal Sclera/Conjunctiva] : normal sclera/conjunctiva [PERRL] : pupils equal round and reactive to light [EOMI] : extraocular movements intact [Normal Outer Ear/Nose] : the outer ears and nose were normal in appearance [Normal Oropharynx] : the oropharynx was normal [No JVD] : no jugular venous distention [No Lymphadenopathy] : no lymphadenopathy [Supple] : supple [Thyroid Normal, No Nodules] : the thyroid was normal and there were no nodules present [No Respiratory Distress] : no respiratory distress  [No Accessory Muscle Use] : no accessory muscle use [Clear to Auscultation] : lungs were clear to auscultation bilaterally [Normal Rate] : normal rate  [Normal S1, S2] : normal S1 and S2 [Regular Rhythm] : with a regular rhythm [No Murmur] : no murmur heard [No Carotid Bruits] : no carotid bruits [No Varicosities] : no varicosities [No Abdominal Bruit] : a ~M bruit was not heard ~T in the abdomen [No Edema] : there was no peripheral edema [Pedal Pulses Present] : the pedal pulses are present [No Palpable Aorta] : no palpable aorta [Soft] : abdomen soft [No Extremity Clubbing/Cyanosis] : no extremity clubbing/cyanosis [Non-distended] : non-distended [Non Tender] : non-tender [No Masses] : no abdominal mass palpated [No HSM] : no HSM [Normal Bowel Sounds] : normal bowel sounds [Normal Anterior Cervical Nodes] : no anterior cervical lymphadenopathy [Normal Posterior Cervical Nodes] : no posterior cervical lymphadenopathy [No CVA Tenderness] : no CVA  tenderness [No Spinal Tenderness] : no spinal tenderness [No Joint Swelling] : no joint swelling [Grossly Normal Strength/Tone] : grossly normal strength/tone [No Rash] : no rash [Coordination Grossly Intact] : coordination grossly intact [No Focal Deficits] : no focal deficits [Normal Gait] : normal gait [Deep Tendon Reflexes (DTR)] : deep tendon reflexes were 2+ and symmetric [Normal Affect] : the affect was normal [Normal Insight/Judgement] : insight and judgment were intact Griseofulvin Pregnancy And Lactation Text: This medication is Pregnancy Category X and is known to cause serious birth defects. It is unknown if this medication is excreted in breast milk but breast feeding should be avoided.

## 2020-11-17 ENCOUNTER — APPOINTMENT (OUTPATIENT)
Dept: INTERNAL MEDICINE | Facility: CLINIC | Age: 65
End: 2020-11-17
Payer: MEDICARE

## 2020-11-17 VITALS
BODY MASS INDEX: 34.45 KG/M2 | DIASTOLIC BLOOD PRESSURE: 90 MMHG | WEIGHT: 207 LBS | HEART RATE: 68 BPM | SYSTOLIC BLOOD PRESSURE: 140 MMHG

## 2020-11-17 LAB
ALBUMIN SERPL ELPH-MCNC: 4.5 G/DL
ALP BLD-CCNC: 79 U/L
ALT SERPL-CCNC: 29 U/L
ANION GAP SERPL CALC-SCNC: 11 MMOL/L
AST SERPL-CCNC: 26 U/L
BILIRUB SERPL-MCNC: 0.3 MG/DL
BUN SERPL-MCNC: 14 MG/DL
CALCIUM SERPL-MCNC: 9.7 MG/DL
CHLORIDE SERPL-SCNC: 104 MMOL/L
CHOLEST SERPL-MCNC: 139 MG/DL
CO2 SERPL-SCNC: 25 MMOL/L
CREAT SERPL-MCNC: 0.67 MG/DL
ESTIMATED AVERAGE GLUCOSE: 174 MG/DL
GLUCOSE SERPL-MCNC: 111 MG/DL
HBA1C MFR BLD HPLC: 7.7 %
HDLC SERPL-MCNC: 36 MG/DL
LDLC SERPL CALC-MCNC: 82 MG/DL
NONHDLC SERPL-MCNC: 103 MG/DL
POTASSIUM SERPL-SCNC: 4.7 MMOL/L
PROT SERPL-MCNC: 7 G/DL
SODIUM SERPL-SCNC: 140 MMOL/L
TRIGL SERPL-MCNC: 107 MG/DL

## 2020-11-17 PROCEDURE — G0008: CPT

## 2020-11-17 PROCEDURE — 90686 IIV4 VACC NO PRSV 0.5 ML IM: CPT

## 2020-11-17 PROCEDURE — 99214 OFFICE O/P EST MOD 30 MIN: CPT | Mod: 25

## 2020-11-17 PROCEDURE — 36415 COLL VENOUS BLD VENIPUNCTURE: CPT

## 2020-11-17 NOTE — ASSESSMENT
[FreeTextEntry1] : bp elevated today did not take his meds prior to coming advised to do so when he comes to his appt and that it will not affect his lab test\par due for lipid check\par s/p cva doing great\par dm check labs last labs good\par flu vaccine given \par

## 2020-11-17 NOTE — HISTORY OF PRESENT ILLNESS
[FreeTextEntry1] : follow up dm, htn, hld s/p cva [de-identified] : follow medical issues\par did not take his bp meds this am\par feels fine\par no chest pain sob nvd or palpitaotns\par works intermittently\par due for flu vaccine

## 2020-11-18 ENCOUNTER — NON-APPOINTMENT (OUTPATIENT)
Age: 65
End: 2020-11-18

## 2021-01-08 ENCOUNTER — RX RENEWAL (OUTPATIENT)
Age: 66
End: 2021-01-08

## 2021-02-27 ENCOUNTER — RX RENEWAL (OUTPATIENT)
Age: 66
End: 2021-02-27

## 2021-03-18 ENCOUNTER — APPOINTMENT (OUTPATIENT)
Dept: INTERNAL MEDICINE | Facility: CLINIC | Age: 66
End: 2021-03-18
Payer: MEDICARE

## 2021-03-18 VITALS
DIASTOLIC BLOOD PRESSURE: 74 MMHG | BODY MASS INDEX: 34.49 KG/M2 | WEIGHT: 207 LBS | SYSTOLIC BLOOD PRESSURE: 126 MMHG | RESPIRATION RATE: 12 BRPM | HEIGHT: 65 IN | HEART RATE: 73 BPM

## 2021-03-18 PROCEDURE — 36415 COLL VENOUS BLD VENIPUNCTURE: CPT

## 2021-03-18 PROCEDURE — 99214 OFFICE O/P EST MOD 30 MIN: CPT | Mod: 25

## 2021-03-18 NOTE — HISTORY OF PRESENT ILLNESS
[FreeTextEntry1] : follow up dm, htn, hld s/p cva [de-identified] : follow up htn, dm, hld\par s/p cva\par has been doing great\par no new issues\par checks sugars infrequently

## 2021-03-19 LAB
ALBUMIN SERPL ELPH-MCNC: 4.7 G/DL
ALP BLD-CCNC: 85 U/L
ALT SERPL-CCNC: 22 U/L
ANION GAP SERPL CALC-SCNC: 14 MMOL/L
APPEARANCE: CLEAR
AST SERPL-CCNC: 26 U/L
BASOPHILS # BLD AUTO: 0.09 K/UL
BASOPHILS NFR BLD AUTO: 1.3 %
BILIRUB SERPL-MCNC: 0.3 MG/DL
BILIRUBIN URINE: NEGATIVE
BLOOD URINE: NEGATIVE
BUN SERPL-MCNC: 13 MG/DL
CALCIUM SERPL-MCNC: 9.8 MG/DL
CHLORIDE SERPL-SCNC: 103 MMOL/L
CHOLEST SERPL-MCNC: 142 MG/DL
CO2 SERPL-SCNC: 24 MMOL/L
COLOR: NORMAL
CREAT SERPL-MCNC: 0.61 MG/DL
CREAT SPEC-SCNC: 79 MG/DL
EOSINOPHIL # BLD AUTO: 1.02 K/UL
EOSINOPHIL NFR BLD AUTO: 15.2 %
ESTIMATED AVERAGE GLUCOSE: 166 MG/DL
GLUCOSE QUALITATIVE U: NEGATIVE
GLUCOSE SERPL-MCNC: 82 MG/DL
HBA1C MFR BLD HPLC: 7.4 %
HCT VFR BLD CALC: 42.5 %
HDLC SERPL-MCNC: 34 MG/DL
HGB BLD-MCNC: 13.4 G/DL
IMM GRANULOCYTES NFR BLD AUTO: 0.3 %
KETONES URINE: NEGATIVE
LDLC SERPL CALC-MCNC: 84 MG/DL
LEUKOCYTE ESTERASE URINE: NEGATIVE
LYMPHOCYTES # BLD AUTO: 1.44 K/UL
LYMPHOCYTES NFR BLD AUTO: 21.5 %
MAN DIFF?: NORMAL
MCHC RBC-ENTMCNC: 24.5 PG
MCHC RBC-ENTMCNC: 31.5 GM/DL
MCV RBC AUTO: 77.8 FL
MICROALBUMIN 24H UR DL<=1MG/L-MCNC: <1.2 MG/DL
MICROALBUMIN/CREAT 24H UR-RTO: NORMAL MG/G
MONOCYTES # BLD AUTO: 0.43 K/UL
MONOCYTES NFR BLD AUTO: 6.4 %
NEUTROPHILS # BLD AUTO: 3.7 K/UL
NEUTROPHILS NFR BLD AUTO: 55.3 %
NITRITE URINE: NEGATIVE
NONHDLC SERPL-MCNC: 108 MG/DL
PH URINE: 7.5
PLATELET # BLD AUTO: 323 K/UL
POTASSIUM SERPL-SCNC: 4.4 MMOL/L
PROT SERPL-MCNC: 7.2 G/DL
PROTEIN URINE: NEGATIVE
RBC # BLD: 5.46 M/UL
RBC # FLD: 15.9 %
SODIUM SERPL-SCNC: 141 MMOL/L
SPECIFIC GRAVITY URINE: 1.02
TRIGL SERPL-MCNC: 122 MG/DL
UROBILINOGEN URINE: NORMAL
WBC # FLD AUTO: 6.7 K/UL

## 2021-07-22 ENCOUNTER — APPOINTMENT (OUTPATIENT)
Dept: INTERNAL MEDICINE | Facility: CLINIC | Age: 66
End: 2021-07-22
Payer: SELF-PAY

## 2021-07-22 VITALS
BODY MASS INDEX: 35.49 KG/M2 | HEART RATE: 68 BPM | RESPIRATION RATE: 16 BRPM | HEIGHT: 65 IN | WEIGHT: 213 LBS | DIASTOLIC BLOOD PRESSURE: 78 MMHG | SYSTOLIC BLOOD PRESSURE: 130 MMHG

## 2021-07-22 LAB
GLUCOSE BLDC GLUCOMTR-MCNC: 119
HBA1C MFR BLD HPLC: 7.9

## 2021-07-22 PROCEDURE — 83036 HEMOGLOBIN GLYCOSYLATED A1C: CPT | Mod: QW

## 2021-07-22 PROCEDURE — 82962 GLUCOSE BLOOD TEST: CPT

## 2021-07-22 PROCEDURE — 99213 OFFICE O/P EST LOW 20 MIN: CPT | Mod: 25

## 2021-07-22 NOTE — ASSESSMENT
[FreeTextEntry1] : bp stable\par hld stable\par dm stable glucose 119\par a1c pending\par spent 15 minutes does not need renewal on meds right now\par medicare returns in  September

## 2021-07-22 NOTE — HISTORY OF PRESENT ILLNESS
[FreeTextEntry1] : follow up htn dm, s/p cva [de-identified] : follow up medical issues\par history of htn dm, cva, hld\par has been taking all meds\par Medicare restarts in September

## 2021-12-21 ENCOUNTER — APPOINTMENT (OUTPATIENT)
Dept: INTERNAL MEDICINE | Facility: CLINIC | Age: 66
End: 2021-12-21

## 2022-04-11 PROBLEM — Z11.59 SCREENING FOR VIRAL DISEASE: Status: ACTIVE | Noted: 2020-07-07

## 2022-06-08 ENCOUNTER — LABORATORY RESULT (OUTPATIENT)
Age: 67
End: 2022-06-08

## 2022-06-08 ENCOUNTER — APPOINTMENT (OUTPATIENT)
Dept: INTERNAL MEDICINE | Facility: CLINIC | Age: 67
End: 2022-06-08
Payer: MEDICARE

## 2022-06-08 ENCOUNTER — NON-APPOINTMENT (OUTPATIENT)
Age: 67
End: 2022-06-08

## 2022-06-08 VITALS — BODY MASS INDEX: 34.95 KG/M2 | WEIGHT: 210 LBS

## 2022-06-08 VITALS — SYSTOLIC BLOOD PRESSURE: 129 MMHG | HEART RATE: 72 BPM | DIASTOLIC BLOOD PRESSURE: 82 MMHG | RESPIRATION RATE: 12 BRPM

## 2022-06-08 PROCEDURE — 36415 COLL VENOUS BLD VENIPUNCTURE: CPT

## 2022-06-08 PROCEDURE — G0439: CPT

## 2022-06-08 PROCEDURE — 93000 ELECTROCARDIOGRAM COMPLETE: CPT

## 2022-06-08 NOTE — ASSESSMENT
[FreeTextEntry1] : For CPE\par has been well\par due for colonscopy\par due for eye exam\par continue meds\par was off chol meds restarted

## 2022-06-08 NOTE — HEALTH RISK ASSESSMENT
[Excellent] : ~his/her~  mood as  excellent [Never] : Never [No] : No [0] : 2) Feeling down, depressed, or hopeless: Not at all (0) [PHQ-2 Negative - No further assessment needed] : PHQ-2 Negative - No further assessment needed [[id number]] : 0 [HIV test declined] : HIV test declined [Hepatitis C test declined] : Hepatitis C test declined [Change in mental status noted] : No change in mental status noted [Language] : denies difficulty with language [Behavior] : denies difficulty with behavior [Learning/Retaining New Information] : denies difficulty learning/retaining new information [Handling Complex Tasks] : denies difficulty handling complex tasks [Reasoning] : denies difficulty with reasoning [Spatial Ability and Orientation] : denies difficulty with spatial ability and orientation [None] : None [With Significant Other] : lives with significant other [Retired] : retired [High School] : high school [] :  [Sexually Active] : sexually active [High Risk Behavior] : no high risk behavior [Feels Safe at Home] : Feels safe at home [Reports changes in hearing] : Reports no changes in hearing [Reports changes in vision] : Reports no changes in vision [Reports normal functional visual acuity (ie: able to read med bottle)] : Reports poor functional visual acuity.  [Reports changes in dental health] : Reports no changes in dental health [Smoke Detector] : smoke detector [Carbon Monoxide Detector] : carbon monoxide detector [Guns at Home] : no guns at home [Safety elements used in home] : safety elements used in home [Seat Belt] :  uses seat belt [Sunscreen] : does not use sunscreen [Travel to Developing Areas] : does not  travel to developing areas [TB Exposure] : is not being exposed to tuberculosis [Caregiver Concerns] : does not have caregiver concerns [ColonoscopyDate] : Due [AdvancecareDate] : 6/8/2022

## 2022-06-08 NOTE — HISTORY OF PRESENT ILLNESS
[FreeTextEntry1] : For CPE [de-identified] : For CPE\par has htn hld cva niddm\par has been taking meds\par no new issues\par now retired lives with son

## 2022-06-09 ENCOUNTER — NON-APPOINTMENT (OUTPATIENT)
Age: 67
End: 2022-06-09

## 2022-06-09 LAB
25(OH)D3 SERPL-MCNC: 23.9 NG/ML
ALBUMIN SERPL ELPH-MCNC: 4.6 G/DL
ALP BLD-CCNC: 107 U/L
ALT SERPL-CCNC: 39 U/L
ANION GAP SERPL CALC-SCNC: 12 MMOL/L
APPEARANCE: CLEAR
AST SERPL-CCNC: 30 U/L
BASOPHILS # BLD AUTO: 0.1 K/UL
BASOPHILS NFR BLD AUTO: 1.6 %
BILIRUB SERPL-MCNC: 0.3 MG/DL
BILIRUBIN URINE: NEGATIVE
BLOOD URINE: NEGATIVE
BUN SERPL-MCNC: 10 MG/DL
CALCIUM SERPL-MCNC: 9.7 MG/DL
CHLORIDE SERPL-SCNC: 102 MMOL/L
CHOLEST SERPL-MCNC: 217 MG/DL
CO2 SERPL-SCNC: 26 MMOL/L
COLOR: YELLOW
COVID-19 SPIKE DOMAIN ANTIBODY INTERPRETATION: POSITIVE
CREAT SERPL-MCNC: 0.59 MG/DL
CREAT SPEC-SCNC: 247 MG/DL
EGFR: 108 ML/MIN/1.73M2
EOSINOPHIL # BLD AUTO: 0.94 K/UL
EOSINOPHIL NFR BLD AUTO: 15.5 %
ESTIMATED AVERAGE GLUCOSE: 197 MG/DL
GLUCOSE QUALITATIVE U: NEGATIVE
GLUCOSE SERPL-MCNC: 119 MG/DL
HBA1C MFR BLD HPLC: 8.5 %
HCT VFR BLD CALC: 43.9 %
HDLC SERPL-MCNC: 34 MG/DL
HGB BLD-MCNC: 13.2 G/DL
IMM GRANULOCYTES NFR BLD AUTO: 0.3 %
KETONES URINE: NEGATIVE
LDLC SERPL CALC-MCNC: 143 MG/DL
LEUKOCYTE ESTERASE URINE: NEGATIVE
LYMPHOCYTES # BLD AUTO: 1.55 K/UL
LYMPHOCYTES NFR BLD AUTO: 25.5 %
MAN DIFF?: NORMAL
MCHC RBC-ENTMCNC: 24 PG
MCHC RBC-ENTMCNC: 30.1 GM/DL
MCV RBC AUTO: 79.7 FL
MICROALBUMIN 24H UR DL<=1MG/L-MCNC: 3.5 MG/DL
MICROALBUMIN/CREAT 24H UR-RTO: 14 MG/G
MONOCYTES # BLD AUTO: 0.36 K/UL
MONOCYTES NFR BLD AUTO: 5.9 %
NEUTROPHILS # BLD AUTO: 3.11 K/UL
NEUTROPHILS NFR BLD AUTO: 51.2 %
NITRITE URINE: NEGATIVE
NONHDLC SERPL-MCNC: 183 MG/DL
PH URINE: 6
PLATELET # BLD AUTO: 323 K/UL
POTASSIUM SERPL-SCNC: 4.6 MMOL/L
PROT SERPL-MCNC: 6.9 G/DL
PROTEIN URINE: ABNORMAL
PSA SERPL-MCNC: 0.19 NG/ML
RBC # BLD: 5.51 M/UL
RBC # FLD: 16 %
SARS-COV-2 AB SERPL IA-ACNC: >250 U/ML
SODIUM SERPL-SCNC: 140 MMOL/L
SPECIFIC GRAVITY URINE: 1.03
T4 FREE SERPL-MCNC: 1.3 NG/DL
TRIGL SERPL-MCNC: 203 MG/DL
TSH SERPL-ACNC: 0.77 UIU/ML
UROBILINOGEN URINE: NORMAL
WBC # FLD AUTO: 6.08 K/UL

## 2022-06-24 ENCOUNTER — OFFICE (OUTPATIENT)
Dept: URBAN - METROPOLITAN AREA CLINIC 115 | Facility: CLINIC | Age: 67
Setting detail: OPHTHALMOLOGY
End: 2022-06-24
Payer: MEDICARE

## 2022-06-24 DIAGNOSIS — H35.033: ICD-10-CM

## 2022-06-24 DIAGNOSIS — E11.9: ICD-10-CM

## 2022-06-24 PROCEDURE — 92004 COMPRE OPH EXAM NEW PT 1/>: CPT | Performed by: OPTOMETRIST

## 2022-06-24 PROCEDURE — 92250 FUNDUS PHOTOGRAPHY W/I&R: CPT | Performed by: OPTOMETRIST

## 2022-06-24 ASSESSMENT — REFRACTION_MANIFEST
OD_SPHERE: +3.25
OD_SPHERE: +0.50
OD_CYLINDER: -1.00
OD_AXIS: 75
OD_AXIS: 75
OD_VA1: 20/25
OS_VA1: 20/25
OS_SPHERE: +2.75
OD_CYLINDER: -0.75
OS_SPHERE: PLANO

## 2022-06-24 ASSESSMENT — REFRACTION_AUTOREFRACTION
OD_SPHERE: +2.25
OD_AXIS: 079
OS_SPHERE: +1.25
OS_CYLINDER: -1.25
OS_AXIS: 083
OD_CYLINDER: -1.50

## 2022-06-24 ASSESSMENT — SPHEQUIV_DERIVED
OS_SPHEQUIV: 0.625
OD_SPHEQUIV: 1.5
OD_SPHEQUIV: 2.875
OD_SPHEQUIV: 0

## 2022-06-24 ASSESSMENT — REFRACTION_CURRENTRX
OD_OVR_VA: 20/
OS_OVR_VA: 20/

## 2022-06-24 ASSESSMENT — VISUAL ACUITY
OD_BCVA: 20/25+1
OS_BCVA: 20/25+1

## 2022-06-24 ASSESSMENT — TONOMETRY
OD_IOP_MMHG: 18
OS_IOP_MMHG: 17

## 2022-09-16 ENCOUNTER — APPOINTMENT (OUTPATIENT)
Dept: INTERNAL MEDICINE | Facility: CLINIC | Age: 67
End: 2022-09-16

## 2022-09-16 VITALS
SYSTOLIC BLOOD PRESSURE: 116 MMHG | WEIGHT: 211 LBS | BODY MASS INDEX: 35.11 KG/M2 | HEART RATE: 65 BPM | DIASTOLIC BLOOD PRESSURE: 80 MMHG | RESPIRATION RATE: 16 BRPM

## 2022-09-16 DIAGNOSIS — Z23 ENCOUNTER FOR IMMUNIZATION: ICD-10-CM

## 2022-09-16 PROCEDURE — G0009: CPT

## 2022-09-16 PROCEDURE — 36415 COLL VENOUS BLD VENIPUNCTURE: CPT

## 2022-09-16 PROCEDURE — G0008: CPT

## 2022-09-16 PROCEDURE — 99214 OFFICE O/P EST MOD 30 MIN: CPT | Mod: 25

## 2022-09-16 PROCEDURE — 90662 IIV NO PRSV INCREASED AG IM: CPT

## 2022-09-16 PROCEDURE — 90677 PCV20 VACCINE IM: CPT

## 2022-09-16 NOTE — HISTORY OF PRESENT ILLNESS
[FreeTextEntry1] : follow up dm, htn hld s/p cva [de-identified] : follow up dm, htn hld\par has been taking meds\par prior cva but recovered

## 2022-09-17 LAB
ALBUMIN SERPL ELPH-MCNC: 4.8 G/DL
ALP BLD-CCNC: 78 U/L
ALT SERPL-CCNC: 43 U/L
ANION GAP SERPL CALC-SCNC: 14 MMOL/L
AST SERPL-CCNC: 33 U/L
BILIRUB SERPL-MCNC: 0.3 MG/DL
BUN SERPL-MCNC: 12 MG/DL
CALCIUM SERPL-MCNC: 9.7 MG/DL
CHLORIDE SERPL-SCNC: 104 MMOL/L
CHOLEST SERPL-MCNC: 150 MG/DL
CO2 SERPL-SCNC: 22 MMOL/L
CREAT SERPL-MCNC: 0.52 MG/DL
EGFR: 112 ML/MIN/1.73M2
ESTIMATED AVERAGE GLUCOSE: 166 MG/DL
GLUCOSE SERPL-MCNC: 110 MG/DL
HBA1C MFR BLD HPLC: 7.4 %
HDLC SERPL-MCNC: 36 MG/DL
LDLC SERPL CALC-MCNC: 87 MG/DL
NONHDLC SERPL-MCNC: 114 MG/DL
POTASSIUM SERPL-SCNC: 4.4 MMOL/L
PROT SERPL-MCNC: 7.3 G/DL
SODIUM SERPL-SCNC: 141 MMOL/L
TRIGL SERPL-MCNC: 132 MG/DL

## 2022-09-20 ENCOUNTER — NON-APPOINTMENT (OUTPATIENT)
Age: 67
End: 2022-09-20

## 2022-11-28 ENCOUNTER — RX RENEWAL (OUTPATIENT)
Age: 67
End: 2022-11-28

## 2022-12-19 ENCOUNTER — RX RENEWAL (OUTPATIENT)
Age: 67
End: 2022-12-19

## 2023-01-17 ENCOUNTER — APPOINTMENT (OUTPATIENT)
Dept: INTERNAL MEDICINE | Facility: CLINIC | Age: 68
End: 2023-01-17
Payer: MEDICARE

## 2023-01-17 VITALS
HEIGHT: 65 IN | SYSTOLIC BLOOD PRESSURE: 126 MMHG | RESPIRATION RATE: 12 BRPM | HEART RATE: 60 BPM | BODY MASS INDEX: 34.49 KG/M2 | DIASTOLIC BLOOD PRESSURE: 82 MMHG | WEIGHT: 207 LBS

## 2023-01-17 PROCEDURE — 36415 COLL VENOUS BLD VENIPUNCTURE: CPT

## 2023-01-17 PROCEDURE — 99214 OFFICE O/P EST MOD 30 MIN: CPT | Mod: 25

## 2023-01-17 NOTE — HISTORY OF PRESENT ILLNESS
[FreeTextEntry1] : follow up dm htn hld [de-identified] : follow up dm htn hld\par s/p cva has been well\par no new health issues, no chest pain sob nvd or palpitations

## 2023-01-17 NOTE — ASSESSMENT
[FreeTextEntry1] : dm a1c\par bp stable renew enalapril\par hld stable\par continue meds\par appears stabe\par cpe in [place]

## 2023-01-17 NOTE — HEALTH RISK ASSESSMENT
[Never] : Never [No] : No [No falls in past year] : Patient reported no falls in the past year [0] : 2) Feeling down, depressed, or hopeless: Not at all (0) [PHQ-2 Negative - No further assessment needed] : PHQ-2 Negative - No further assessment needed [[id number]] : 0

## 2023-01-18 LAB
ALBUMIN SERPL ELPH-MCNC: 4.4 G/DL
ALP BLD-CCNC: 82 U/L
ALT SERPL-CCNC: 24 U/L
ANION GAP SERPL CALC-SCNC: 13 MMOL/L
AST SERPL-CCNC: 21 U/L
BILIRUB SERPL-MCNC: 0.3 MG/DL
BUN SERPL-MCNC: 12 MG/DL
CALCIUM SERPL-MCNC: 9.8 MG/DL
CHLORIDE SERPL-SCNC: 103 MMOL/L
CHOLEST SERPL-MCNC: 148 MG/DL
CO2 SERPL-SCNC: 26 MMOL/L
CREAT SERPL-MCNC: 0.62 MG/DL
EGFR: 105 ML/MIN/1.73M2
ESTIMATED AVERAGE GLUCOSE: 154 MG/DL
GLUCOSE SERPL-MCNC: 66 MG/DL
HBA1C MFR BLD HPLC: 7 %
HDLC SERPL-MCNC: 32 MG/DL
LDLC SERPL CALC-MCNC: 84 MG/DL
NONHDLC SERPL-MCNC: 117 MG/DL
POTASSIUM SERPL-SCNC: 4.6 MMOL/L
PROT SERPL-MCNC: 7 G/DL
SODIUM SERPL-SCNC: 142 MMOL/L
TRIGL SERPL-MCNC: 164 MG/DL

## 2023-01-31 ENCOUNTER — OFFICE (OUTPATIENT)
Dept: URBAN - METROPOLITAN AREA CLINIC 115 | Facility: CLINIC | Age: 68
Setting detail: OPHTHALMOLOGY
End: 2023-01-31
Payer: MEDICARE

## 2023-01-31 DIAGNOSIS — E11.9: ICD-10-CM

## 2023-01-31 DIAGNOSIS — H25.13: ICD-10-CM

## 2023-01-31 DIAGNOSIS — H35.033: ICD-10-CM

## 2023-01-31 PROCEDURE — 92014 COMPRE OPH EXAM EST PT 1/>: CPT | Performed by: OPHTHALMOLOGY

## 2023-01-31 ASSESSMENT — REFRACTION_AUTOREFRACTION
OS_AXIS: 076
OS_CYLINDER: -0.50
OS_SPHERE: +0.50
OD_AXIS: 088
OD_CYLINDER: -2.00
OD_SPHERE: +2.50

## 2023-01-31 ASSESSMENT — REFRACTION_MANIFEST
OS_SPHERE: +2.75
OD_SPHERE: +3.25
OD_AXIS: 75
OD_CYLINDER: -0.75
OS_VA1: 20/30-
OD_VA1: 20/25
OD_AXIS: 75

## 2023-01-31 ASSESSMENT — REFRACTION_CURRENTRX
OD_SPHERE: +1.75
OS_SPHERE: +1.75
OS_OVR_VA: 20/
OD_OVR_VA: 20/

## 2023-01-31 ASSESSMENT — TONOMETRY
OD_IOP_MMHG: 17
OS_IOP_MMHG: 19

## 2023-01-31 ASSESSMENT — SPHEQUIV_DERIVED
OD_SPHEQUIV: 2.875
OS_SPHEQUIV: 0.25
OD_SPHEQUIV: 1.5

## 2023-01-31 ASSESSMENT — CONFRONTATIONAL VISUAL FIELD TEST (CVF)
OS_FINDINGS: FULL
OD_FINDINGS: FULL

## 2023-02-16 ENCOUNTER — RX RENEWAL (OUTPATIENT)
Age: 68
End: 2023-02-16

## 2023-03-31 ENCOUNTER — RX RENEWAL (OUTPATIENT)
Age: 68
End: 2023-03-31

## 2023-06-09 ENCOUNTER — NON-APPOINTMENT (OUTPATIENT)
Age: 68
End: 2023-06-09

## 2023-06-09 ENCOUNTER — APPOINTMENT (OUTPATIENT)
Dept: INTERNAL MEDICINE | Facility: CLINIC | Age: 68
End: 2023-06-09
Payer: MEDICARE

## 2023-06-09 VITALS
DIASTOLIC BLOOD PRESSURE: 85 MMHG | WEIGHT: 204 LBS | HEART RATE: 55 BPM | RESPIRATION RATE: 16 BRPM | BODY MASS INDEX: 33.99 KG/M2 | HEIGHT: 65 IN | SYSTOLIC BLOOD PRESSURE: 118 MMHG

## 2023-06-09 DIAGNOSIS — Z00.00 ENCOUNTER FOR GENERAL ADULT MEDICAL EXAMINATION W/OUT ABNORMAL FINDINGS: ICD-10-CM

## 2023-06-09 PROCEDURE — 36415 COLL VENOUS BLD VENIPUNCTURE: CPT

## 2023-06-09 PROCEDURE — G0439: CPT

## 2023-06-09 PROCEDURE — 93000 ELECTROCARDIOGRAM COMPLETE: CPT

## 2023-06-09 NOTE — HISTORY OF PRESENT ILLNESS
[FreeTextEntry1] : For CPE [de-identified] : For CPE\par has history of dm, htn hld cva\par has no chest pain sob nvd or palpitatons\par has been taking all meds\par no new issues\par discussed ozempic with him

## 2023-06-09 NOTE — HEALTH RISK ASSESSMENT
[Excellent] : ~his/her~  mood as  excellent [No] : No [No falls in past year] : Patient reported no falls in the past year [0] : 2) Feeling down, depressed, or hopeless: Not at all (0) [PHQ-2 Negative - No further assessment needed] : PHQ-2 Negative - No further assessment needed [[id number]] : 0 [HIV test declined] : HIV test declined [Hepatitis C test declined] : Hepatitis C test declined [Change in mental status noted] : No change in mental status noted [Language] : denies difficulty with language [Behavior] : denies difficulty with behavior [Learning/Retaining New Information] : denies difficulty learning/retaining new information [Handling Complex Tasks] : denies difficulty handling complex tasks [Reasoning] : denies difficulty with reasoning [Spatial Ability and Orientation] : denies difficulty with spatial ability and orientation [With Significant Other] : lives with significant other [Retired] : retired [High School] : high school [] :  [Sexually Active] : sexually active [High Risk Behavior] : no high risk behavior [Feels Safe at Home] : Feels safe at home [Fully functional (bathing, dressing, toileting, transferring, walking, feeding)] : Fully functional (bathing, dressing, toileting, transferring, walking, feeding) [Fully functional (using the telephone, shopping, preparing meals, housekeeping, doing laundry, using] : Fully functional and needs no help or supervision to perform IADLs (using the telephone, shopping, preparing meals, housekeeping, doing laundry, using transportation, managing medications and managing finances) [Reports changes in hearing] : Reports no changes in hearing [Reports changes in vision] : Reports no changes in vision [Reports normal functional visual acuity (ie: able to read med bottle)] : Reports poor functional visual acuity.  [Reports changes in dental health] : Reports no changes in dental health [Smoke Detector] : smoke detector [Carbon Monoxide Detector] : carbon monoxide detector [Guns at Home] : no guns at home [Safety elements used in home] : safety elements used in home [Seat Belt] :  uses seat belt [Sunscreen] : does not use sunscreen [Travel to Developing Areas] : does not  travel to developing areas [TB Exposure] : is not being exposed to tuberculosis [Caregiver Concerns] : does not have caregiver concerns [ColonoscopyDate] : due [Patient/Caregiver not ready to engage] : , patient/caregiver not ready to engage [AdvancecareDate] : 6/9/23 [Never] : Never

## 2023-06-09 NOTE — ASSESSMENT
[FreeTextEntry1] : bp stable\par hld to be checked\par dm stable\par ekg ok\par due for colonoscopy\par consider ozempic

## 2023-06-10 LAB
ALBUMIN SERPL ELPH-MCNC: 4.7 G/DL
ALP BLD-CCNC: 81 U/L
ALT SERPL-CCNC: 28 U/L
ANION GAP SERPL CALC-SCNC: 14 MMOL/L
APPEARANCE: CLEAR
AST SERPL-CCNC: 30 U/L
BILIRUB SERPL-MCNC: 0.3 MG/DL
BILIRUBIN URINE: NEGATIVE
BLOOD URINE: NEGATIVE
BUN SERPL-MCNC: 15 MG/DL
CALCIUM SERPL-MCNC: 9.7 MG/DL
CHLORIDE SERPL-SCNC: 105 MMOL/L
CHOLEST SERPL-MCNC: 137 MG/DL
CO2 SERPL-SCNC: 23 MMOL/L
COLOR: YELLOW
CREAT SERPL-MCNC: 0.75 MG/DL
CREAT SPEC-SCNC: 197 MG/DL
EGFR: 100 ML/MIN/1.73M2
ESTIMATED AVERAGE GLUCOSE: 148 MG/DL
GLUCOSE QUALITATIVE U: NEGATIVE MG/DL
GLUCOSE SERPL-MCNC: 133 MG/DL
HBA1C MFR BLD HPLC: 6.8 %
HDLC SERPL-MCNC: 39 MG/DL
KETONES URINE: NEGATIVE MG/DL
LDLC SERPL CALC-MCNC: 84 MG/DL
LEUKOCYTE ESTERASE URINE: NEGATIVE
MICROALBUMIN 24H UR DL<=1MG/L-MCNC: 2 MG/DL
MICROALBUMIN/CREAT 24H UR-RTO: 10 MG/G
NITRITE URINE: NEGATIVE
NONHDLC SERPL-MCNC: 99 MG/DL
PH URINE: 7
POTASSIUM SERPL-SCNC: 5 MMOL/L
PROT SERPL-MCNC: 6.8 G/DL
PROTEIN URINE: NORMAL MG/DL
PSA SERPL-MCNC: 0.2 NG/ML
SODIUM SERPL-SCNC: 142 MMOL/L
SPECIFIC GRAVITY URINE: 1.03
T4 FREE SERPL-MCNC: 1.3 NG/DL
TRIGL SERPL-MCNC: 71 MG/DL
TSH SERPL-ACNC: 0.87 UIU/ML
UROBILINOGEN URINE: 1 MG/DL

## 2023-07-02 NOTE — COUNSELING
[Encouraged to maintain food diary] : Encouraged to maintain food diary [Encouraged to increase physical activity] : Encouraged to increase physical activity [Encouraged to use exercise tracking device] : Encouraged to use exercise tracking device No

## 2023-07-14 ENCOUNTER — OFFICE (OUTPATIENT)
Dept: URBAN - METROPOLITAN AREA CLINIC 115 | Facility: CLINIC | Age: 68
Setting detail: OPHTHALMOLOGY
End: 2023-07-14
Payer: MEDICARE

## 2023-07-14 DIAGNOSIS — H25.13: ICD-10-CM

## 2023-07-14 DIAGNOSIS — H35.033: ICD-10-CM

## 2023-07-14 DIAGNOSIS — E11.9: ICD-10-CM

## 2023-07-14 PROCEDURE — 92014 COMPRE OPH EXAM EST PT 1/>: CPT | Performed by: OPTOMETRIST

## 2023-07-14 PROCEDURE — 92250 FUNDUS PHOTOGRAPHY W/I&R: CPT | Performed by: OPTOMETRIST

## 2023-07-14 ASSESSMENT — SPHEQUIV_DERIVED
OD_SPHEQUIV: 2
OS_SPHEQUIV: 0.625
OD_SPHEQUIV: 2.875

## 2023-07-14 ASSESSMENT — REFRACTION_MANIFEST
OD_AXIS: 75
OS_VA1: 20/30-
OD_AXIS: 75
OD_SPHERE: +3.25
OS_SPHERE: +2.75
OD_CYLINDER: -0.75
OD_VA1: 20/25

## 2023-07-14 ASSESSMENT — REFRACTION_CURRENTRX
OS_SPHERE: +1.75
OS_OVR_VA: 20/
OD_SPHERE: +1.75
OD_OVR_VA: 20/

## 2023-07-14 ASSESSMENT — TONOMETRY
OD_IOP_MMHG: 17
OS_IOP_MMHG: 16

## 2023-07-14 ASSESSMENT — VISUAL ACUITY
OD_BCVA: 20/25
OS_BCVA: 20/30-2

## 2023-07-14 ASSESSMENT — REFRACTION_AUTOREFRACTION
OD_AXIS: 080
OS_CYLINDER: -1.25
OD_CYLINDER: -2.00
OS_SPHERE: +1.25
OS_AXIS: 085
OD_SPHERE: +3.00

## 2023-07-14 ASSESSMENT — CONFRONTATIONAL VISUAL FIELD TEST (CVF)
OD_FINDINGS: FULL
OS_FINDINGS: FULL

## 2023-08-27 ENCOUNTER — RX RENEWAL (OUTPATIENT)
Age: 68
End: 2023-08-27

## 2023-10-06 ENCOUNTER — APPOINTMENT (OUTPATIENT)
Dept: INTERNAL MEDICINE | Facility: CLINIC | Age: 68
End: 2023-10-06
Payer: MEDICARE

## 2023-10-06 VITALS
HEIGHT: 65 IN | BODY MASS INDEX: 33.99 KG/M2 | WEIGHT: 204 LBS | SYSTOLIC BLOOD PRESSURE: 116 MMHG | DIASTOLIC BLOOD PRESSURE: 82 MMHG

## 2023-10-06 DIAGNOSIS — Z23 ENCOUNTER FOR IMMUNIZATION: ICD-10-CM

## 2023-10-06 DIAGNOSIS — H61.23 IMPACTED CERUMEN, BILATERAL: ICD-10-CM

## 2023-10-06 PROCEDURE — 36415 COLL VENOUS BLD VENIPUNCTURE: CPT

## 2023-10-06 PROCEDURE — G0008: CPT

## 2023-10-06 PROCEDURE — 99214 OFFICE O/P EST MOD 30 MIN: CPT | Mod: 25

## 2023-10-06 PROCEDURE — 90662 IIV NO PRSV INCREASED AG IM: CPT

## 2023-10-06 RX ORDER — BLOOD SUGAR DIAGNOSTIC
STRIP MISCELLANEOUS TWICE DAILY
Qty: 200 | Refills: 2 | Status: ACTIVE | COMMUNITY
Start: 2023-10-06 | End: 1900-01-01

## 2023-10-07 LAB
ALBUMIN SERPL ELPH-MCNC: 4.7 G/DL
ALP BLD-CCNC: 79 U/L
ALT SERPL-CCNC: 29 U/L
ANION GAP SERPL CALC-SCNC: 10 MMOL/L
AST SERPL-CCNC: 28 U/L
BASOPHILS # BLD AUTO: 0.09 K/UL
BASOPHILS NFR BLD AUTO: 1.4 %
BILIRUB SERPL-MCNC: 0.3 MG/DL
BUN SERPL-MCNC: 11 MG/DL
CALCIUM SERPL-MCNC: 9.6 MG/DL
CHLORIDE SERPL-SCNC: 104 MMOL/L
CHOLEST SERPL-MCNC: 147 MG/DL
CO2 SERPL-SCNC: 25 MMOL/L
CREAT SERPL-MCNC: 0.63 MG/DL
EGFR: 104 ML/MIN/1.73M2
EOSINOPHIL # BLD AUTO: 0.56 K/UL
EOSINOPHIL NFR BLD AUTO: 8.9 %
ESTIMATED AVERAGE GLUCOSE: 157 MG/DL
GLUCOSE SERPL-MCNC: 111 MG/DL
HBA1C MFR BLD HPLC: 7.1 %
HCT VFR BLD CALC: 42.4 %
HDLC SERPL-MCNC: 35 MG/DL
HGB BLD-MCNC: 13.3 G/DL
IMM GRANULOCYTES NFR BLD AUTO: 0.2 %
LDLC SERPL CALC-MCNC: 89 MG/DL
LYMPHOCYTES # BLD AUTO: 1.42 K/UL
LYMPHOCYTES NFR BLD AUTO: 22.6 %
MAN DIFF?: NORMAL
MCHC RBC-ENTMCNC: 24.3 PG
MCHC RBC-ENTMCNC: 31.4 GM/DL
MCV RBC AUTO: 77.5 FL
MONOCYTES # BLD AUTO: 0.34 K/UL
MONOCYTES NFR BLD AUTO: 5.4 %
NEUTROPHILS # BLD AUTO: 3.85 K/UL
NEUTROPHILS NFR BLD AUTO: 61.5 %
NONHDLC SERPL-MCNC: 112 MG/DL
PLATELET # BLD AUTO: 340 K/UL
POTASSIUM SERPL-SCNC: 4.4 MMOL/L
PROT SERPL-MCNC: 7 G/DL
RBC # BLD: 5.47 M/UL
RBC # FLD: 17.2 %
SODIUM SERPL-SCNC: 139 MMOL/L
TRIGL SERPL-MCNC: 126 MG/DL
WBC # FLD AUTO: 6.27 K/UL

## 2023-12-01 ENCOUNTER — RX RENEWAL (OUTPATIENT)
Age: 68
End: 2023-12-01

## 2024-01-16 ENCOUNTER — RX RENEWAL (OUTPATIENT)
Age: 69
End: 2024-01-16

## 2024-02-06 ENCOUNTER — APPOINTMENT (OUTPATIENT)
Dept: INTERNAL MEDICINE | Facility: CLINIC | Age: 69
End: 2024-02-06
Payer: MEDICARE

## 2024-02-06 VITALS
SYSTOLIC BLOOD PRESSURE: 120 MMHG | DIASTOLIC BLOOD PRESSURE: 82 MMHG | WEIGHT: 204 LBS | HEIGHT: 65 IN | BODY MASS INDEX: 33.99 KG/M2 | RESPIRATION RATE: 12 BRPM | HEART RATE: 69 BPM

## 2024-02-06 DIAGNOSIS — E78.5 HYPERLIPIDEMIA, UNSPECIFIED: ICD-10-CM

## 2024-02-06 DIAGNOSIS — I10 ESSENTIAL (PRIMARY) HYPERTENSION: ICD-10-CM

## 2024-02-06 DIAGNOSIS — E11.9 TYPE 2 DIABETES MELLITUS W/OUT COMPLICATIONS: ICD-10-CM

## 2024-02-06 PROCEDURE — G0444 DEPRESSION SCREEN ANNUAL: CPT | Mod: 59

## 2024-02-06 PROCEDURE — 36415 COLL VENOUS BLD VENIPUNCTURE: CPT

## 2024-02-06 PROCEDURE — 99214 OFFICE O/P EST MOD 30 MIN: CPT

## 2024-02-06 RX ORDER — NIFEDIPINE 60 MG/1
60 TABLET, FILM COATED, EXTENDED RELEASE ORAL DAILY
Qty: 90 | Refills: 3 | Status: ACTIVE | COMMUNITY
Start: 2019-02-11 | End: 1900-01-01

## 2024-02-06 NOTE — HEALTH RISK ASSESSMENT
[No] : No [No falls in past year] : Patient reported no falls in the past year [Little interest or pleasure doing things] : 1) Little interest or pleasure doing things [Feeling down, depressed, or hopeless] : 2) Feeling down, depressed, or hopeless [0] : 2) Feeling down, depressed, or hopeless: Not at all (0) [PHQ-2 Positive] : PHQ-2 Positive [[id number]] : 0 [Never] : Never

## 2024-02-06 NOTE — HISTORY OF PRESENT ILLNESS
[FreeTextEntry1] : follow up dm htn hld [de-identified] : follow up dm htn hld prior cva has been well no new issues, no chest pain sob nvd or palpitations

## 2024-02-07 LAB
ALBUMIN SERPL ELPH-MCNC: 4.6 G/DL
ALP BLD-CCNC: 80 U/L
ALT SERPL-CCNC: 22 U/L
ANION GAP SERPL CALC-SCNC: 13 MMOL/L
AST SERPL-CCNC: 19 U/L
BASOPHILS # BLD AUTO: 0.09 K/UL
BASOPHILS NFR BLD AUTO: 1.3 %
BILIRUB SERPL-MCNC: 0.2 MG/DL
BUN SERPL-MCNC: 13 MG/DL
CALCIUM SERPL-MCNC: 9.2 MG/DL
CHLORIDE SERPL-SCNC: 103 MMOL/L
CHOLEST SERPL-MCNC: 148 MG/DL
CO2 SERPL-SCNC: 24 MMOL/L
CREAT SERPL-MCNC: 0.6 MG/DL
EGFR: 106 ML/MIN/1.73M2
EOSINOPHIL # BLD AUTO: 1.22 K/UL
EOSINOPHIL NFR BLD AUTO: 17.7 %
ESTIMATED AVERAGE GLUCOSE: 154 MG/DL
GLUCOSE SERPL-MCNC: 68 MG/DL
HBA1C MFR BLD HPLC: 7 %
HCT VFR BLD CALC: 41.1 %
HDLC SERPL-MCNC: 36 MG/DL
HGB BLD-MCNC: 12.9 G/DL
IMM GRANULOCYTES NFR BLD AUTO: 0.3 %
LDLC SERPL CALC-MCNC: 88 MG/DL
LYMPHOCYTES # BLD AUTO: 1.44 K/UL
LYMPHOCYTES NFR BLD AUTO: 20.9 %
MAN DIFF?: NORMAL
MCHC RBC-ENTMCNC: 24 PG
MCHC RBC-ENTMCNC: 31.4 GM/DL
MCV RBC AUTO: 76.5 FL
MONOCYTES # BLD AUTO: 0.41 K/UL
MONOCYTES NFR BLD AUTO: 6 %
NEUTROPHILS # BLD AUTO: 3.7 K/UL
NEUTROPHILS NFR BLD AUTO: 53.8 %
NONHDLC SERPL-MCNC: 112 MG/DL
PLATELET # BLD AUTO: 323 K/UL
POTASSIUM SERPL-SCNC: 4.5 MMOL/L
PROT SERPL-MCNC: 7.4 G/DL
RBC # BLD: 5.37 M/UL
RBC # FLD: 15.9 %
SODIUM SERPL-SCNC: 140 MMOL/L
TRIGL SERPL-MCNC: 135 MG/DL
WBC # FLD AUTO: 6.88 K/UL

## 2024-02-12 ENCOUNTER — NON-APPOINTMENT (OUTPATIENT)
Age: 69
End: 2024-02-12

## 2024-03-07 ENCOUNTER — OFFICE (OUTPATIENT)
Dept: URBAN - METROPOLITAN AREA CLINIC 94 | Facility: CLINIC | Age: 69
Setting detail: OPHTHALMOLOGY
End: 2024-03-07
Payer: MEDICARE

## 2024-03-07 DIAGNOSIS — H35.033: ICD-10-CM

## 2024-03-07 DIAGNOSIS — E11.9: ICD-10-CM

## 2024-03-07 DIAGNOSIS — H25.13: ICD-10-CM

## 2024-03-07 PROCEDURE — 92012 INTRM OPH EXAM EST PATIENT: CPT | Performed by: OPHTHALMOLOGY

## 2024-03-07 ASSESSMENT — REFRACTION_CURRENTRX
OS_OVR_VA: 20/
OS_SPHERE: +1.75
OD_OVR_VA: 20/
OD_SPHERE: +1.75

## 2024-03-07 ASSESSMENT — REFRACTION_MANIFEST
OS_SPHERE: +2.75
OD_AXIS: 75
OD_VA1: 20/25
OD_CYLINDER: -0.75
OD_AXIS: 75
OD_SPHERE: +3.25
OS_VA1: 20/30-

## 2024-03-07 ASSESSMENT — SPHEQUIV_DERIVED: OD_SPHEQUIV: 2.875

## 2024-04-07 ENCOUNTER — RX RENEWAL (OUTPATIENT)
Age: 69
End: 2024-04-07

## 2024-07-08 RX ORDER — BLOOD-GLUCOSE METER
W/DEVICE EACH MISCELLANEOUS
Qty: 1 | Refills: 0 | Status: ACTIVE | COMMUNITY
Start: 2024-07-08 | End: 1900-01-01

## 2024-07-08 RX ORDER — BLOOD SUGAR DIAGNOSTIC
STRIP MISCELLANEOUS
Qty: 1 | Refills: 5 | Status: ACTIVE | COMMUNITY
Start: 2024-07-08 | End: 1900-01-01

## 2024-07-08 RX ORDER — LANCETS
EACH MISCELLANEOUS
Qty: 1 | Refills: 2 | Status: ACTIVE | COMMUNITY
Start: 2024-07-08 | End: 1900-01-01

## 2024-07-30 ENCOUNTER — NON-APPOINTMENT (OUTPATIENT)
Age: 69
End: 2024-07-30

## 2024-07-30 ENCOUNTER — APPOINTMENT (OUTPATIENT)
Dept: INTERNAL MEDICINE | Facility: CLINIC | Age: 69
End: 2024-07-30
Payer: MEDICARE

## 2024-07-30 VITALS
SYSTOLIC BLOOD PRESSURE: 118 MMHG | BODY MASS INDEX: 33.99 KG/M2 | WEIGHT: 204 LBS | RESPIRATION RATE: 12 BRPM | DIASTOLIC BLOOD PRESSURE: 78 MMHG | HEART RATE: 72 BPM | HEIGHT: 65 IN

## 2024-07-30 DIAGNOSIS — I10 ESSENTIAL (PRIMARY) HYPERTENSION: ICD-10-CM

## 2024-07-30 DIAGNOSIS — E11.9 TYPE 2 DIABETES MELLITUS W/OUT COMPLICATIONS: ICD-10-CM

## 2024-07-30 DIAGNOSIS — Z23 ENCOUNTER FOR IMMUNIZATION: ICD-10-CM

## 2024-07-30 DIAGNOSIS — Z00.00 ENCOUNTER FOR GENERAL ADULT MEDICAL EXAMINATION W/OUT ABNORMAL FINDINGS: ICD-10-CM

## 2024-07-30 DIAGNOSIS — E78.5 HYPERLIPIDEMIA, UNSPECIFIED: ICD-10-CM

## 2024-07-30 LAB
25(OH)D3 SERPL-MCNC: 22.5 NG/ML
ALBUMIN SERPL ELPH-MCNC: 4.5 G/DL
ALP BLD-CCNC: 73 U/L
ALT SERPL-CCNC: 25 U/L
ANION GAP SERPL CALC-SCNC: 15 MMOL/L
AST SERPL-CCNC: 26 U/L
BASOPHILS # BLD AUTO: 0.05 K/UL
BASOPHILS NFR BLD AUTO: 0.7 %
BILIRUB SERPL-MCNC: 0.3 MG/DL
BUN SERPL-MCNC: 13 MG/DL
CALCIUM SERPL-MCNC: 9.5 MG/DL
CHLORIDE SERPL-SCNC: 104 MMOL/L
CHOLEST SERPL-MCNC: 153 MG/DL
CO2 SERPL-SCNC: 23 MMOL/L
CREAT SERPL-MCNC: 0.66 MG/DL
EGFR: 103 ML/MIN/1.73M2
EOSINOPHIL # BLD AUTO: 0.43 K/UL
EOSINOPHIL NFR BLD AUTO: 5.7 %
ESTIMATED AVERAGE GLUCOSE: 169 MG/DL
GLUCOSE SERPL-MCNC: 68 MG/DL
HBA1C MFR BLD HPLC: 7.5 %
HCT VFR BLD CALC: 40.5 %
HDLC SERPL-MCNC: 34 MG/DL
HGB BLD-MCNC: 12.6 G/DL
IMM GRANULOCYTES NFR BLD AUTO: 0.3 %
LDLC SERPL CALC-MCNC: 93 MG/DL
LYMPHOCYTES # BLD AUTO: 1.46 K/UL
LYMPHOCYTES NFR BLD AUTO: 19.3 %
MAN DIFF?: NORMAL
MCHC RBC-ENTMCNC: 24.3 PG
MCHC RBC-ENTMCNC: 31.1 GM/DL
MCV RBC AUTO: 78 FL
MONOCYTES # BLD AUTO: 0.39 K/UL
MONOCYTES NFR BLD AUTO: 5.1 %
NEUTROPHILS # BLD AUTO: 5.23 K/UL
NEUTROPHILS NFR BLD AUTO: 68.9 %
NONHDLC SERPL-MCNC: 119 MG/DL
PLATELET # BLD AUTO: 288 K/UL
POTASSIUM SERPL-SCNC: 4.3 MMOL/L
PROT SERPL-MCNC: 6.9 G/DL
PSA SERPL-MCNC: 0.22 NG/ML
RBC # BLD: 5.19 M/UL
RBC # FLD: 17.2 %
SODIUM SERPL-SCNC: 141 MMOL/L
T4 FREE SERPL-MCNC: 1.3 NG/DL
TRIGL SERPL-MCNC: 144 MG/DL
TSH SERPL-ACNC: 0.99 UIU/ML
WBC # FLD AUTO: 7.58 K/UL

## 2024-07-30 PROCEDURE — 90715 TDAP VACCINE 7 YRS/> IM: CPT

## 2024-07-30 PROCEDURE — G0439: CPT

## 2024-07-30 PROCEDURE — 90471 IMMUNIZATION ADMIN: CPT

## 2024-07-30 PROCEDURE — 93000 ELECTROCARDIOGRAM COMPLETE: CPT | Mod: 59

## 2024-07-30 PROCEDURE — 36415 COLL VENOUS BLD VENIPUNCTURE: CPT

## 2024-07-30 PROCEDURE — G0444 DEPRESSION SCREEN ANNUAL: CPT | Mod: 59

## 2024-07-30 NOTE — HEALTH RISK ASSESSMENT
[Excellent] : ~his/her~  mood as  excellent [No] : In the past 12 months have you used drugs other than those required for medical reasons? No [No falls in past year] : Patient reported no falls in the past year [Little interest or pleasure doing things] : 1) Little interest or pleasure doing things [Feeling down, depressed, or hopeless] : 2) Feeling down, depressed, or hopeless [0] : 2) Feeling down, depressed, or hopeless: Not at all (0) [PHQ-2 Negative - No further assessment needed] : PHQ-2 Negative - No further assessment needed [[id number]] : 0 [Never] : Never [NO] : No [HIV test declined] : HIV test declined [Hepatitis C test declined] : Hepatitis C test declined [Change in mental status noted] : No change in mental status noted [Language] : denies difficulty with language [Behavior] : denies difficulty with behavior [Learning/Retaining New Information] : denies difficulty learning/retaining new information [Handling Complex Tasks] : denies difficulty handling complex tasks [Reasoning] : denies difficulty with reasoning [Spatial Ability and Orientation] : denies difficulty with spatial ability and orientation [None] : None [With Family] : lives with family [Retired] : retired [Less Than High School] : less than high school [] :  [Sexually Active] : sexually active [High Risk Behavior] : no high risk behavior [Feels Safe at Home] : Feels safe at home [Fully functional (bathing, dressing, toileting, transferring, walking, feeding)] : Fully functional (bathing, dressing, toileting, transferring, walking, feeding) [Fully functional (using the telephone, shopping, preparing meals, housekeeping, doing laundry, using] : Fully functional and needs no help or supervision to perform IADLs (using the telephone, shopping, preparing meals, housekeeping, doing laundry, using transportation, managing medications and managing finances) [Reports changes in hearing] : Reports no changes in hearing [Reports changes in vision] : Reports no changes in vision [Reports normal functional visual acuity (ie: able to read med bottle)] : Reports poor functional visual acuity.  [Reports changes in dental health] : Reports no changes in dental health [Smoke Detector] : smoke detector [Safety elements used in home] : safety elements used in home [Sunscreen] : does not use sunscreen [Travel to Developing Areas] : does not  travel to developing areas [TB Exposure] : is not being exposed to tuberculosis [Caregiver Concerns] : does not have caregiver concerns [ColonoscopyDate] : due [ColonoscopyComments] : will cline fecal occult

## 2024-07-30 NOTE — ASSESSMENT
[FreeTextEntry1] : fecal occult dm stable htn stalbe cva stable hld stable follow up 4months ekg ok tdap given

## 2024-11-01 ENCOUNTER — APPOINTMENT (OUTPATIENT)
Dept: INTERNAL MEDICINE | Facility: CLINIC | Age: 69
End: 2024-11-01
Payer: MEDICARE

## 2024-11-01 VITALS
HEIGHT: 65 IN | WEIGHT: 211 LBS | BODY MASS INDEX: 35.16 KG/M2 | DIASTOLIC BLOOD PRESSURE: 82 MMHG | SYSTOLIC BLOOD PRESSURE: 131 MMHG

## 2024-11-01 DIAGNOSIS — E11.9 TYPE 2 DIABETES MELLITUS W/OUT COMPLICATIONS: ICD-10-CM

## 2024-11-01 DIAGNOSIS — Z23 ENCOUNTER FOR IMMUNIZATION: ICD-10-CM

## 2024-11-01 DIAGNOSIS — E78.5 HYPERLIPIDEMIA, UNSPECIFIED: ICD-10-CM

## 2024-11-01 DIAGNOSIS — I10 ESSENTIAL (PRIMARY) HYPERTENSION: ICD-10-CM

## 2024-11-01 PROCEDURE — G2211 COMPLEX E/M VISIT ADD ON: CPT

## 2024-11-01 PROCEDURE — 36415 COLL VENOUS BLD VENIPUNCTURE: CPT

## 2024-11-01 PROCEDURE — G0008: CPT

## 2024-11-01 PROCEDURE — 99214 OFFICE O/P EST MOD 30 MIN: CPT

## 2024-11-01 PROCEDURE — 90662 IIV NO PRSV INCREASED AG IM: CPT

## 2024-11-02 LAB
ALBUMIN SERPL ELPH-MCNC: 4.7 G/DL
ALP BLD-CCNC: 80 U/L
ALT SERPL-CCNC: 27 U/L
ANION GAP SERPL CALC-SCNC: 11 MMOL/L
AST SERPL-CCNC: 32 U/L
BASOPHILS # BLD AUTO: 0.11 K/UL
BASOPHILS NFR BLD AUTO: 1.5 %
BILIRUB SERPL-MCNC: 0.4 MG/DL
BUN SERPL-MCNC: 16 MG/DL
CALCIUM SERPL-MCNC: 9.8 MG/DL
CHLORIDE SERPL-SCNC: 101 MMOL/L
CHOLEST SERPL-MCNC: 157 MG/DL
CO2 SERPL-SCNC: 27 MMOL/L
CREAT SERPL-MCNC: 0.7 MG/DL
EGFR: 100 ML/MIN/1.73M2
EOSINOPHIL # BLD AUTO: 1.44 K/UL
EOSINOPHIL NFR BLD AUTO: 19.2 %
ESTIMATED AVERAGE GLUCOSE: 171 MG/DL
GLUCOSE SERPL-MCNC: 90 MG/DL
HBA1C MFR BLD HPLC: 7.6 %
HCT VFR BLD CALC: 41.1 %
HDLC SERPL-MCNC: 36 MG/DL
HGB BLD-MCNC: 13.2 G/DL
IMM GRANULOCYTES NFR BLD AUTO: 0.3 %
LDLC SERPL CALC-MCNC: 102 MG/DL
LYMPHOCYTES # BLD AUTO: 1.81 K/UL
LYMPHOCYTES NFR BLD AUTO: 24.1 %
MAN DIFF?: NORMAL
MCHC RBC-ENTMCNC: 24.7 PG
MCHC RBC-ENTMCNC: 32.1 G/DL
MCV RBC AUTO: 77 FL
MONOCYTES # BLD AUTO: 0.54 K/UL
MONOCYTES NFR BLD AUTO: 7.2 %
NEUTROPHILS # BLD AUTO: 3.58 K/UL
NEUTROPHILS NFR BLD AUTO: 47.7 %
NONHDLC SERPL-MCNC: 121 MG/DL
PLATELET # BLD AUTO: 329 K/UL
POTASSIUM SERPL-SCNC: 5.4 MMOL/L
PROT SERPL-MCNC: 7.1 G/DL
RBC # BLD: 5.34 M/UL
RBC # FLD: 16.2 %
SODIUM SERPL-SCNC: 140 MMOL/L
TRIGL SERPL-MCNC: 105 MG/DL
WBC # FLD AUTO: 7.5 K/UL

## 2024-11-21 ENCOUNTER — RX RENEWAL (OUTPATIENT)
Age: 69
End: 2024-11-21

## 2024-12-10 ENCOUNTER — RX RENEWAL (OUTPATIENT)
Age: 69
End: 2024-12-10

## 2024-12-20 ENCOUNTER — RX RENEWAL (OUTPATIENT)
Age: 69
End: 2024-12-20

## 2024-12-21 ENCOUNTER — RX RENEWAL (OUTPATIENT)
Age: 69
End: 2024-12-21

## 2024-12-21 RX ORDER — LANCETS
EACH MISCELLANEOUS
Qty: 100 | Refills: 3 | Status: ACTIVE | COMMUNITY
Start: 2024-12-21 | End: 1900-01-01

## 2025-01-04 ENCOUNTER — RX RENEWAL (OUTPATIENT)
Age: 70
End: 2025-01-04

## 2025-02-14 ENCOUNTER — RX RENEWAL (OUTPATIENT)
Age: 70
End: 2025-02-14

## 2025-03-03 ENCOUNTER — APPOINTMENT (OUTPATIENT)
Dept: INTERNAL MEDICINE | Facility: CLINIC | Age: 70
End: 2025-03-03
Payer: MEDICARE

## 2025-03-03 VITALS — HEART RATE: 68 BPM | SYSTOLIC BLOOD PRESSURE: 130 MMHG | DIASTOLIC BLOOD PRESSURE: 80 MMHG | RESPIRATION RATE: 16 BRPM

## 2025-03-03 VITALS — BODY MASS INDEX: 34.99 KG/M2 | HEIGHT: 65 IN | WEIGHT: 210 LBS

## 2025-03-03 DIAGNOSIS — E11.9 TYPE 2 DIABETES MELLITUS W/OUT COMPLICATIONS: ICD-10-CM

## 2025-03-03 DIAGNOSIS — I10 ESSENTIAL (PRIMARY) HYPERTENSION: ICD-10-CM

## 2025-03-03 DIAGNOSIS — E78.5 HYPERLIPIDEMIA, UNSPECIFIED: ICD-10-CM

## 2025-03-03 PROCEDURE — G2211 COMPLEX E/M VISIT ADD ON: CPT

## 2025-03-03 PROCEDURE — 99214 OFFICE O/P EST MOD 30 MIN: CPT

## 2025-03-03 PROCEDURE — 36415 COLL VENOUS BLD VENIPUNCTURE: CPT

## 2025-03-03 RX ORDER — SEMAGLUTIDE 0.68 MG/ML
2 INJECTION, SOLUTION SUBCUTANEOUS
Qty: 1 | Refills: 3 | Status: ACTIVE | COMMUNITY
Start: 2025-03-03 | End: 1900-01-01

## 2025-03-04 LAB
ALBUMIN SERPL ELPH-MCNC: 4.5 G/DL
ALP BLD-CCNC: 82 U/L
ALT SERPL-CCNC: 32 U/L
ANION GAP SERPL CALC-SCNC: 12 MMOL/L
AST SERPL-CCNC: 24 U/L
BASOPHILS # BLD AUTO: 0.08 K/UL
BASOPHILS NFR BLD AUTO: 1.1 %
BILIRUB SERPL-MCNC: 0.2 MG/DL
BUN SERPL-MCNC: 10 MG/DL
CALCIUM SERPL-MCNC: 9.7 MG/DL
CHLORIDE SERPL-SCNC: 101 MMOL/L
CHOLEST SERPL-MCNC: 162 MG/DL
CO2 SERPL-SCNC: 28 MMOL/L
CREAT SERPL-MCNC: 0.59 MG/DL
EGFRCR SERPLBLD CKD-EPI 2021: 106 ML/MIN/1.73M2
EOSINOPHIL # BLD AUTO: 0.96 K/UL
EOSINOPHIL NFR BLD AUTO: 13.2 %
ESTIMATED AVERAGE GLUCOSE: 203 MG/DL
GLUCOSE SERPL-MCNC: 144 MG/DL
HBA1C MFR BLD HPLC: 8.7 %
HCT VFR BLD CALC: 41.5 %
HDLC SERPL-MCNC: 32 MG/DL
HGB BLD-MCNC: 13.1 G/DL
IMM GRANULOCYTES NFR BLD AUTO: 0.1 %
LDLC SERPL CALC-MCNC: 99 MG/DL
LYMPHOCYTES # BLD AUTO: 1.52 K/UL
LYMPHOCYTES NFR BLD AUTO: 21 %
MAN DIFF?: NORMAL
MCHC RBC-ENTMCNC: 24.2 PG
MCHC RBC-ENTMCNC: 31.6 G/DL
MCV RBC AUTO: 76.7 FL
MONOCYTES # BLD AUTO: 0.39 K/UL
MONOCYTES NFR BLD AUTO: 5.4 %
NEUTROPHILS # BLD AUTO: 4.29 K/UL
NEUTROPHILS NFR BLD AUTO: 59.2 %
NONHDLC SERPL-MCNC: 130 MG/DL
PLATELET # BLD AUTO: 313 K/UL
POTASSIUM SERPL-SCNC: 4.9 MMOL/L
PROT SERPL-MCNC: 6.8 G/DL
RBC # BLD: 5.41 M/UL
RBC # FLD: 16.5 %
SODIUM SERPL-SCNC: 141 MMOL/L
TRIGL SERPL-MCNC: 174 MG/DL
WBC # FLD AUTO: 7.25 K/UL

## 2025-03-13 ENCOUNTER — OFFICE (OUTPATIENT)
Dept: URBAN - METROPOLITAN AREA CLINIC 94 | Facility: CLINIC | Age: 70
Setting detail: OPHTHALMOLOGY
End: 2025-03-13
Payer: MEDICARE

## 2025-03-13 DIAGNOSIS — E11.9: ICD-10-CM

## 2025-03-13 DIAGNOSIS — H35.033: ICD-10-CM

## 2025-03-13 DIAGNOSIS — H25.13: ICD-10-CM

## 2025-03-13 PROCEDURE — 92014 COMPRE OPH EXAM EST PT 1/>: CPT | Performed by: OPHTHALMOLOGY

## 2025-03-13 PROCEDURE — 92250 FUNDUS PHOTOGRAPHY W/I&R: CPT | Performed by: OPHTHALMOLOGY

## 2025-03-13 ASSESSMENT — REFRACTION_MANIFEST
OD_AXIS: 75
OD_AXIS: 75
OS_SPHERE: +2.75
OD_VA1: 20/25
OD_CYLINDER: -0.75
OS_VA1: 20/30-
OD_SPHERE: +3.25

## 2025-03-13 ASSESSMENT — KERATOMETRY
OD_K2POWER_DIOPTERS: 43.75
OS_AXISANGLE_DEGREES: 090
OD_K1POWER_DIOPTERS: 42.50
OS_K1POWER_DIOPTERS: 43.25
OD_AXISANGLE_DEGREES: 166
OS_K2POWER_DIOPTERS: 43.25

## 2025-03-13 ASSESSMENT — REFRACTION_AUTOREFRACTION
OD_SPHERE: +2.75
OD_AXIS: 084
OS_AXIS: 084
OD_CYLINDER: -2.00
OS_CYLINDER: -1.75
OS_SPHERE: +1.75

## 2025-03-13 ASSESSMENT — CONFRONTATIONAL VISUAL FIELD TEST (CVF)
OS_FINDINGS: FULL
OD_FINDINGS: FULL

## 2025-03-13 ASSESSMENT — REFRACTION_CURRENTRX
OS_OVR_VA: 20/
OD_SPHERE: +1.75
OD_OVR_VA: 20/
OS_SPHERE: +1.75

## 2025-03-13 ASSESSMENT — TONOMETRY
OS_IOP_MMHG: 16
OD_IOP_MMHG: 16

## 2025-03-13 ASSESSMENT — VISUAL ACUITY
OD_BCVA: 20/25
OS_BCVA: 20/40-1

## 2025-03-28 NOTE — HISTORY OF PRESENT ILLNESS
[FreeTextEntry1] : follow up dm, htn hld\par  s/p cva no sequela [de-identified] : patient with htn hld, dm\par has been well\par has been taking all his meds\par he has no chest pain sob nvd\par has no issues to reprot Normal mood and affect, no apparent risk to self or others

## 2025-05-22 ENCOUNTER — RX RENEWAL (OUTPATIENT)
Age: 70
End: 2025-05-22

## 2025-07-01 ENCOUNTER — RX RENEWAL (OUTPATIENT)
Age: 70
End: 2025-07-01

## 2025-07-01 RX ORDER — BLOOD SUGAR DIAGNOSTIC
STRIP MISCELLANEOUS
Qty: 2 | Refills: 4 | Status: ACTIVE | COMMUNITY
Start: 2025-07-01 | End: 1900-01-01

## 2025-08-08 ENCOUNTER — APPOINTMENT (OUTPATIENT)
Dept: INTERNAL MEDICINE | Facility: CLINIC | Age: 70
End: 2025-08-08
Payer: MEDICARE

## 2025-08-08 VITALS — WEIGHT: 214 LBS | BODY MASS INDEX: 35.65 KG/M2 | HEIGHT: 65 IN

## 2025-08-08 VITALS — DIASTOLIC BLOOD PRESSURE: 64 MMHG | SYSTOLIC BLOOD PRESSURE: 118 MMHG | HEART RATE: 77 BPM | RESPIRATION RATE: 16 BRPM

## 2025-08-08 DIAGNOSIS — E11.9 TYPE 2 DIABETES MELLITUS W/OUT COMPLICATIONS: ICD-10-CM

## 2025-08-08 DIAGNOSIS — I10 ESSENTIAL (PRIMARY) HYPERTENSION: ICD-10-CM

## 2025-08-08 DIAGNOSIS — Z12.11 ENCOUNTER FOR SCREENING FOR MALIGNANT NEOPLASM OF COLON: ICD-10-CM

## 2025-08-08 DIAGNOSIS — E78.5 HYPERLIPIDEMIA, UNSPECIFIED: ICD-10-CM

## 2025-08-08 DIAGNOSIS — Z00.00 ENCOUNTER FOR GENERAL ADULT MEDICAL EXAMINATION W/OUT ABNORMAL FINDINGS: ICD-10-CM

## 2025-08-08 PROCEDURE — 93000 ELECTROCARDIOGRAM COMPLETE: CPT

## 2025-08-08 PROCEDURE — 36415 COLL VENOUS BLD VENIPUNCTURE: CPT

## 2025-08-08 PROCEDURE — G0439: CPT

## 2025-08-09 LAB
ALBUMIN SERPL ELPH-MCNC: 4.4 G/DL
ALBUMIN, RANDOM URINE: 4.7 MG/DL
ALP BLD-CCNC: 73 U/L
ALT SERPL-CCNC: 45 U/L
ANION GAP SERPL CALC-SCNC: 14 MMOL/L
APPEARANCE: CLEAR
AST SERPL-CCNC: 34 U/L
BACTERIA: NEGATIVE /HPF
BASOPHILS # BLD AUTO: 0.12 K/UL
BASOPHILS NFR BLD AUTO: 1.5 %
BILIRUB SERPL-MCNC: 0.2 MG/DL
BILIRUBIN URINE: NEGATIVE
BLOOD URINE: NEGATIVE
BUN SERPL-MCNC: 14 MG/DL
CALCIUM SERPL-MCNC: 9.5 MG/DL
CAST: 0 /LPF
CHLORIDE SERPL-SCNC: 104 MMOL/L
CHOLEST SERPL-MCNC: 140 MG/DL
CO2 SERPL-SCNC: 23 MMOL/L
COLOR: NORMAL
CREAT SERPL-MCNC: 0.66 MG/DL
CREAT SPEC-SCNC: 220 MG/DL
EGFRCR SERPLBLD CKD-EPI 2021: 102 ML/MIN/1.73M2
EOSINOPHIL # BLD AUTO: 1.17 K/UL
EOSINOPHIL NFR BLD AUTO: 14.5 %
EPITHELIAL CELLS: 1 /HPF
ESTIMATED AVERAGE GLUCOSE: 192 MG/DL
GLUCOSE QUALITATIVE U: NEGATIVE MG/DL
GLUCOSE SERPL-MCNC: 66 MG/DL
HBA1C MFR BLD HPLC: 8.3 %
HCT VFR BLD CALC: 38.4 %
HDLC SERPL-MCNC: 33 MG/DL
HGB BLD-MCNC: 12.1 G/DL
IMM GRANULOCYTES NFR BLD AUTO: 0.4 %
KETONES URINE: ABNORMAL MG/DL
LDLC SERPL-MCNC: 85 MG/DL
LEUKOCYTE ESTERASE URINE: ABNORMAL
LYMPHOCYTES # BLD AUTO: 1.61 K/UL
LYMPHOCYTES NFR BLD AUTO: 19.9 %
MAN DIFF?: NORMAL
MCHC RBC-ENTMCNC: 23.9 PG
MCHC RBC-ENTMCNC: 31.5 G/DL
MCV RBC AUTO: 75.7 FL
MICROALBUMIN/CREAT 24H UR-RTO: 22 MG/G
MICROSCOPIC-UA: NORMAL
MONOCYTES # BLD AUTO: 0.51 K/UL
MONOCYTES NFR BLD AUTO: 6.3 %
NEUTROPHILS # BLD AUTO: 4.64 K/UL
NEUTROPHILS NFR BLD AUTO: 57.4 %
NITRITE URINE: NEGATIVE
NONHDLC SERPL-MCNC: 107 MG/DL
PH URINE: 6
PLATELET # BLD AUTO: 299 K/UL
POTASSIUM SERPL-SCNC: 4.1 MMOL/L
PROT SERPL-MCNC: 6.9 G/DL
PROTEIN URINE: 30 MG/DL
PSA SERPL-MCNC: 0.24 NG/ML
RBC # BLD: 5.07 M/UL
RBC # FLD: 18.2 %
RED BLOOD CELLS URINE: 3 /HPF
SODIUM SERPL-SCNC: 141 MMOL/L
SPECIFIC GRAVITY URINE: 1.03
T4 FREE SERPL-MCNC: 1.3 NG/DL
TRIGL SERPL-MCNC: 119 MG/DL
TSH SERPL-ACNC: 1.01 UIU/ML
UROBILINOGEN URINE: 1 MG/DL
WBC # FLD AUTO: 8.08 K/UL
WHITE BLOOD CELLS URINE: 0 /HPF